# Patient Record
Sex: FEMALE | Race: WHITE | Employment: UNEMPLOYED | URBAN - METROPOLITAN AREA
[De-identification: names, ages, dates, MRNs, and addresses within clinical notes are randomized per-mention and may not be internally consistent; named-entity substitution may affect disease eponyms.]

---

## 2023-06-23 ENCOUNTER — HOSPITAL ENCOUNTER (OUTPATIENT)
Age: 78
Setting detail: SPECIMEN
Discharge: HOME | End: 2023-06-23

## 2023-06-23 LAB
BACTERIA URNS QL MICRO: ABNORMAL
BILIRUB UR QL STRIP: NEGATIVE
CASTS #/AREA URNS LPF: ABNORMAL /LPF (ref 0–8)
CLARITY UR: CLEAR
COLOR UR: YELLOW
EPI CELLS #/AREA URNS HPF: ABNORMAL /HPF (ref 0–5)
GLUCOSE UR STRIP-MCNC: NEGATIVE MG/DL
HGB UR QL STRIP.AUTO: ABNORMAL
KETONES UR STRIP-MCNC: NEGATIVE MG/DL
LEUKOCYTE ESTERASE UR QL STRIP: ABNORMAL
NITRITE UR QL STRIP: NEGATIVE
PH UR STRIP: 6 [PH] (ref 5–8)
PROT UR STRIP-MCNC: ABNORMAL MG/DL
RBC #/AREA URNS HPF: ABNORMAL /HPF (ref 0–4)
SP GR UR STRIP: 1.01 (ref 1–1.03)
UROBILINOGEN UR STRIP-ACNC: NORMAL
WBC #/AREA URNS HPF: ABNORMAL /HPF (ref 0–5)

## 2023-06-25 LAB
MICROORGANISM SPEC CULT: ABNORMAL
SPECIMEN DESCRIPTION: ABNORMAL

## 2023-06-26 LAB
MICROORGANISM SPEC CULT: ABNORMAL
MICROORGANISM SPEC CULT: ABNORMAL
SPECIMEN DESCRIPTION: ABNORMAL

## 2023-09-21 RX ORDER — LISINOPRIL 10 MG/1
10 TABLET ORAL DAILY
Qty: 90 TABLET | Refills: 0 | Status: SHIPPED | OUTPATIENT
Start: 2023-09-21

## 2023-09-21 RX ORDER — LISINOPRIL 10 MG/1
TABLET ORAL
COMMUNITY
Start: 2023-06-23 | End: 2023-09-21 | Stop reason: SDUPTHER

## 2023-09-21 NOTE — TELEPHONE ENCOUNTER
Glenn Hillman is calling to request a refill on the following medication(s):    Medication Request:  Requested Prescriptions     Pending Prescriptions Disp Refills    lisinopril (PRINIVIL;ZESTRIL) 10 MG tablet 90 tablet 0     Sig: Take 1 tablet by mouth daily       Last Visit Date (If Applicable):  Visit date not found    Next Visit Date:    10/18/2023

## 2023-10-06 RX ORDER — MELOXICAM 15 MG/1
15 TABLET ORAL DAILY
Qty: 100 TABLET | Refills: 2 | Status: SHIPPED | OUTPATIENT
Start: 2023-10-06

## 2023-10-06 NOTE — TELEPHONE ENCOUNTER
Maryann Landaverde is calling to request a refill on the following medication(s):    Medication Request:  Requested Prescriptions     Pending Prescriptions Disp Refills    meloxicam (MOBIC) 15 MG tablet [Pharmacy Med Name: Meloxicam 15 MG Oral Tablet] 100 tablet 2     Sig: TAKE 1 TABLET BY MOUTH ONCE  DAILY       Last Visit Date (If Applicable):  Visit date not found    Next Visit Date:    10/18/2023

## 2023-10-16 RX ORDER — FLUTICASONE FUROATE AND VILANTEROL TRIFENATATE 100; 25 UG/1; UG/1
POWDER RESPIRATORY (INHALATION)
COMMUNITY
Start: 2023-09-05

## 2023-10-16 RX ORDER — ALBUTEROL SULFATE 2.5 MG/3ML
2.5 SOLUTION RESPIRATORY (INHALATION) EVERY 6 HOURS PRN
COMMUNITY

## 2023-10-16 RX ORDER — EZETIMIBE 10 MG/1
TABLET ORAL
COMMUNITY
Start: 2023-07-31

## 2023-10-16 RX ORDER — MONTELUKAST SODIUM 10 MG/1
TABLET ORAL
COMMUNITY
Start: 2023-07-31

## 2023-10-16 RX ORDER — ALBUTEROL SULFATE 90 UG/1
2 AEROSOL, METERED RESPIRATORY (INHALATION) EVERY 6 HOURS PRN
COMMUNITY

## 2023-10-16 RX ORDER — AZELASTINE HCL 205.5 UG/1
SPRAY NASAL
COMMUNITY

## 2023-10-16 RX ORDER — ATORVASTATIN CALCIUM 10 MG/1
TABLET, FILM COATED ORAL
COMMUNITY
Start: 2017-07-24

## 2023-10-18 ENCOUNTER — TELEPHONE (OUTPATIENT)
Age: 78
End: 2023-10-18

## 2023-10-18 ENCOUNTER — OFFICE VISIT (OUTPATIENT)
Age: 78
End: 2023-10-18

## 2023-10-18 ENCOUNTER — HOSPITAL ENCOUNTER (OUTPATIENT)
Age: 78
Setting detail: SPECIMEN
Discharge: HOME | End: 2023-10-18

## 2023-10-18 VITALS
HEART RATE: 70 BPM | TEMPERATURE: 97.9 F | BODY MASS INDEX: 29.7 KG/M2 | DIASTOLIC BLOOD PRESSURE: 66 MMHG | SYSTOLIC BLOOD PRESSURE: 138 MMHG | WEIGHT: 161.4 LBS | RESPIRATION RATE: 14 BRPM | HEIGHT: 62 IN

## 2023-10-18 DIAGNOSIS — E78.2 MIXED HYPERLIPIDEMIA: ICD-10-CM

## 2023-10-18 DIAGNOSIS — F32.A FATIGUE DUE TO DEPRESSION: ICD-10-CM

## 2023-10-18 DIAGNOSIS — I10 PRIMARY HYPERTENSION: ICD-10-CM

## 2023-10-18 DIAGNOSIS — F32.9 REACTIVE DEPRESSION: ICD-10-CM

## 2023-10-18 DIAGNOSIS — Z12.31 ENCOUNTER FOR SCREENING MAMMOGRAM FOR MALIGNANT NEOPLASM OF BREAST: Primary | ICD-10-CM

## 2023-10-18 DIAGNOSIS — R53.83 FATIGUE DUE TO DEPRESSION: ICD-10-CM

## 2023-10-18 LAB
BACTERIA URNS QL MICRO: ABNORMAL
BILIRUB UR QL STRIP: NEGATIVE
CASTS #/AREA URNS LPF: ABNORMAL /LPF (ref 0–8)
CLARITY UR: ABNORMAL
COLOR UR: YELLOW
EPI CELLS #/AREA URNS HPF: ABNORMAL /HPF (ref 0–5)
ERYTHROCYTE [DISTWIDTH] IN BLOOD BY AUTOMATED COUNT: 12.5 % (ref 11.8–14.4)
GLUCOSE UR STRIP-MCNC: NEGATIVE MG/DL
HCT VFR BLD AUTO: 44.7 % (ref 36.3–47.1)
HGB BLD-MCNC: 14.5 G/DL (ref 11.9–15.1)
HGB UR QL STRIP.AUTO: ABNORMAL
IRON SERPL-MCNC: 100 UG/DL (ref 37–145)
KETONES UR STRIP-MCNC: ABNORMAL MG/DL
LEUKOCYTE ESTERASE UR QL STRIP: ABNORMAL
MCH RBC QN AUTO: 31 PG (ref 25.2–33.5)
MCHC RBC AUTO-ENTMCNC: 32.4 G/DL (ref 28.4–34.8)
MCV RBC AUTO: 95.5 FL (ref 82.6–102.9)
NITRITE UR QL STRIP: POSITIVE
NRBC BLD-RTO: 0 PER 100 WBC
PH UR STRIP: 5.5 [PH] (ref 5–8)
PLATELET # BLD AUTO: 287 K/UL (ref 138–453)
PMV BLD AUTO: 10.5 FL (ref 8.1–13.5)
PROT UR STRIP-MCNC: ABNORMAL MG/DL
RBC # BLD AUTO: 4.68 M/UL (ref 3.95–5.11)
RBC #/AREA URNS HPF: ABNORMAL /HPF (ref 0–4)
SP GR UR STRIP: 1.02 (ref 1–1.03)
T4 FREE SERPL-MCNC: 1.3 NG/DL (ref 0.9–1.7)
TSH SERPL DL<=0.05 MIU/L-ACNC: 3.45 UIU/ML (ref 0.3–5)
UROBILINOGEN UR STRIP-ACNC: NORMAL EU/DL (ref 0–1)
WBC #/AREA URNS HPF: ABNORMAL /HPF (ref 0–5)
WBC OTHER # BLD: 5.6 K/UL (ref 3.5–11.3)

## 2023-10-18 RX ORDER — VENLAFAXINE HYDROCHLORIDE 75 MG/1
75 CAPSULE, EXTENDED RELEASE ORAL DAILY
Qty: 30 CAPSULE | Refills: 3 | Status: SHIPPED | OUTPATIENT
Start: 2023-10-18

## 2023-10-18 RX ORDER — CEPHALEXIN 500 MG/1
500 CAPSULE ORAL 3 TIMES DAILY
Qty: 30 CAPSULE | Refills: 0 | Status: SHIPPED | OUTPATIENT
Start: 2023-10-18 | End: 2023-10-28

## 2023-10-18 SDOH — ECONOMIC STABILITY: HOUSING INSECURITY
IN THE LAST 12 MONTHS, WAS THERE A TIME WHEN YOU DID NOT HAVE A STEADY PLACE TO SLEEP OR SLEPT IN A SHELTER (INCLUDING NOW)?: NO

## 2023-10-18 SDOH — ECONOMIC STABILITY: INCOME INSECURITY: HOW HARD IS IT FOR YOU TO PAY FOR THE VERY BASICS LIKE FOOD, HOUSING, MEDICAL CARE, AND HEATING?: NOT HARD AT ALL

## 2023-10-18 SDOH — ECONOMIC STABILITY: FOOD INSECURITY: WITHIN THE PAST 12 MONTHS, YOU WORRIED THAT YOUR FOOD WOULD RUN OUT BEFORE YOU GOT MONEY TO BUY MORE.: NEVER TRUE

## 2023-10-18 SDOH — ECONOMIC STABILITY: FOOD INSECURITY: WITHIN THE PAST 12 MONTHS, THE FOOD YOU BOUGHT JUST DIDN'T LAST AND YOU DIDN'T HAVE MONEY TO GET MORE.: NEVER TRUE

## 2023-10-18 ASSESSMENT — PATIENT HEALTH QUESTIONNAIRE - PHQ9
SUM OF ALL RESPONSES TO PHQ QUESTIONS 1-9: 0
SUM OF ALL RESPONSES TO PHQ QUESTIONS 1-9: 0
SUM OF ALL RESPONSES TO PHQ9 QUESTIONS 1 & 2: 0
1. LITTLE INTEREST OR PLEASURE IN DOING THINGS: 0
SUM OF ALL RESPONSES TO PHQ QUESTIONS 1-9: 0
SUM OF ALL RESPONSES TO PHQ QUESTIONS 1-9: 0
2. FEELING DOWN, DEPRESSED OR HOPELESS: 0

## 2023-10-18 NOTE — RESULT ENCOUNTER NOTE
please call patient CBC was normal thyroid was normal iron is not back yet. However her urine shows a lot of microscopic blood and pus cells. I would like her to start an antibiotic until the culture comes back.   Also I would like her to see urology I cannot find who her old urologist was can she give us an name or does she want a new referral

## 2023-10-18 NOTE — PROGRESS NOTES
[facility] Castellano      Date of Visit:  10/18/2023  Patient Name: Mason Jeffries   Patient :  1945     CHIEF COMPLAINT/HPI:     Mason Jeffries is a 70 y.o. female who presents today for an general visit to be evaluated for the following condition(s):  Chief Complaint   Patient presents with    Check-Up     4 months     Patient has had some situational depression of late one of her good friends  recently and then to top it off one of her dogs suddenly  also. So she is having some sleep difficulty and also some psychomotor inanition a. She is trying to get out and do more gardening which seems to help  REVIEW OF SYSTEM      Review of Systems  No fever no sweats no chills no cough no chest pain no shortness of breath no nausea no vomiting no diarrhea.   Physically feels pretty good    REVIEWED INFORMATION      Allergies   Allergen Reactions    Eggs Or Egg-Derived Products     Milk-Related Compounds     Septra [Sulfamethoxazole-Trimethoprim]     Sulfa Antibiotics Headaches     MIGRAINE      Wheat     Adhesive Tape Other (See Comments) and Rash       Current Outpatient Medications   Medication Sig Dispense Refill    venlafaxine (EFFEXOR XR) 75 MG extended release capsule Take 1 capsule by mouth daily 30 capsule 3    atorvastatin (LIPITOR) 10 MG tablet  (Patient not taking: Reported on 10/16/2023)      ezetimibe (ZETIA) 10 MG tablet       BREO ELLIPTA 100-25 MCG/ACT inhaler       montelukast (SINGULAIR) 10 MG tablet       albuterol sulfate HFA (PROVENTIL;VENTOLIN;PROAIR) 108 (90 Base) MCG/ACT inhaler Inhale 2 puffs into the lungs every 6 hours as needed for Wheezing      azelastine HCl 0.15 % SOLN by Nasal route      albuterol (PROVENTIL) (2.5 MG/3ML) 0.083% nebulizer solution Take 3 mLs by nebulization every 6 hours as needed for Wheezing      meloxicam (MOBIC) 15 MG tablet TAKE 1 TABLET BY MOUTH ONCE  DAILY 100 tablet 2    lisinopril (PRINIVIL;ZESTRIL) 10 MG tablet Take 1 tablet by mouth daily 90

## 2023-10-20 LAB
MICROORGANISM SPEC CULT: ABNORMAL
SPECIMEN DESCRIPTION: ABNORMAL

## 2023-11-08 ENCOUNTER — OFFICE VISIT (OUTPATIENT)
Age: 78
End: 2023-11-08

## 2023-11-08 VITALS
TEMPERATURE: 97.3 F | SYSTOLIC BLOOD PRESSURE: 120 MMHG | BODY MASS INDEX: 30.2 KG/M2 | HEART RATE: 76 BPM | DIASTOLIC BLOOD PRESSURE: 78 MMHG | RESPIRATION RATE: 16 BRPM | WEIGHT: 165.13 LBS

## 2023-11-08 DIAGNOSIS — N20.0 KIDNEY STONES: ICD-10-CM

## 2023-11-08 DIAGNOSIS — R30.0 DYSURIA: ICD-10-CM

## 2023-11-08 DIAGNOSIS — E78.2 MIXED HYPERLIPIDEMIA: ICD-10-CM

## 2023-11-08 DIAGNOSIS — Z23 ENCOUNTER FOR IMMUNIZATION: Primary | ICD-10-CM

## 2023-11-08 DIAGNOSIS — I10 PRIMARY HYPERTENSION: ICD-10-CM

## 2023-11-08 DIAGNOSIS — G96.89 SPINAL CORD CYSTS: Primary | ICD-10-CM

## 2023-11-08 PROBLEM — K21.9 GERD (GASTROESOPHAGEAL REFLUX DISEASE): Status: ACTIVE | Noted: 2023-11-08

## 2023-11-08 PROBLEM — M19.90 OSTEOARTHRITIS: Status: ACTIVE | Noted: 2023-11-08

## 2023-11-08 PROBLEM — J45.909 ASTHMA: Status: ACTIVE | Noted: 2023-11-08

## 2023-11-08 PROBLEM — H26.9 CATARACTS, BILATERAL: Status: ACTIVE | Noted: 2023-11-08

## 2023-11-08 PROBLEM — J30.9 ALLERGIC RHINITIS: Status: ACTIVE | Noted: 2023-11-08

## 2023-11-08 NOTE — PROGRESS NOTES
[facility] Bj Schilling      Date of Visit:  2023  Patient Name: Haile Chavez   Patient :  1945     CHIEF COMPLAINT/HPI:     Haile Chavez is a 70 y.o. female who presents today for an general visit to be evaluated for the following condition(s):  Chief Complaint   Patient presents with    Check-Up     3 week check up       patient here for follow-up regarding Effexor she improved mentally and got over the loss of her dog she is now taking this medication and feels back to normal.  Reviewed immunizations she does need pneumococcal 20 and flu vaccine. Recent urine infection resolved with Keflex she will get repeat urine and culture if this is clear she will not need to be referred to urology.   REVIEW OF SYSTEM      Review of Systems    No fever no sweats no chills no chest pain no shortness of breath no nausea no vomiting no diarrhea no urine symptoms now    REVIEWED INFORMATION      Allergies   Allergen Reactions    Eggs Or Egg-Derived Products     Milk-Related Compounds     Septra [Sulfamethoxazole-Trimethoprim]     Sulfa Antibiotics Headaches     MIGRAINE      Wheat     Adhesive Tape Other (See Comments) and Rash       Current Outpatient Medications   Medication Sig Dispense Refill    atorvastatin (LIPITOR) 10 MG tablet  (Patient not taking: Reported on 10/16/2023)      ezetimibe (ZETIA) 10 MG tablet       BREO ELLIPTA 100-25 MCG/ACT inhaler       montelukast (SINGULAIR) 10 MG tablet       albuterol sulfate HFA (PROVENTIL;VENTOLIN;PROAIR) 108 (90 Base) MCG/ACT inhaler Inhale 2 puffs into the lungs every 6 hours as needed for Wheezing      azelastine HCl 0.15 % SOLN by Nasal route      albuterol (PROVENTIL) (2.5 MG/3ML) 0.083% nebulizer solution Take 3 mLs by nebulization every 6 hours as needed for Wheezing      meloxicam (MOBIC) 15 MG tablet TAKE 1 TABLET BY MOUTH ONCE  DAILY 100 tablet 2    lisinopril (PRINIVIL;ZESTRIL) 10 MG tablet Take 1 tablet by mouth daily 90 tablet 0     No current

## 2023-11-08 NOTE — PROGRESS NOTES
After obtaining consent, and per orders of Dr. Felicia Lorenz, injection of Prevnar 20 given in Right deltoid by Elizabeth Gutierrez MA. Patient tolerated the injection well.

## 2023-11-08 NOTE — PROGRESS NOTES
After obtaining consent, and per orders of Dr. Elsa Chamorro, injection of Tresea Spear dose  flu given in Right deltoid by Alyse Osman MA. Patient tolerated the injection well.

## 2023-11-14 ENCOUNTER — HOSPITAL ENCOUNTER (OUTPATIENT)
Age: 78
Setting detail: SPECIMEN
Discharge: HOME | End: 2023-11-14

## 2023-11-14 ENCOUNTER — TELEPHONE (OUTPATIENT)
Age: 78
End: 2023-11-14

## 2023-11-14 DIAGNOSIS — R30.0 DYSURIA: ICD-10-CM

## 2023-11-14 DIAGNOSIS — R30.0 DYSURIA: Primary | ICD-10-CM

## 2023-11-14 LAB
BACTERIA URNS QL MICRO: ABNORMAL
BILIRUB UR QL STRIP: NEGATIVE
CASTS #/AREA URNS LPF: ABNORMAL /LPF (ref 0–8)
CLARITY UR: ABNORMAL
COLOR UR: YELLOW
EPI CELLS #/AREA URNS HPF: ABNORMAL /HPF (ref 0–5)
GLUCOSE UR STRIP-MCNC: NEGATIVE MG/DL
HGB UR QL STRIP.AUTO: ABNORMAL
KETONES UR STRIP-MCNC: NEGATIVE MG/DL
LEUKOCYTE ESTERASE UR QL STRIP: ABNORMAL
NITRITE UR QL STRIP: POSITIVE
PH UR STRIP: 5 [PH] (ref 5–8)
PROT UR STRIP-MCNC: ABNORMAL MG/DL
RBC #/AREA URNS HPF: ABNORMAL /HPF (ref 0–4)
SP GR UR STRIP: 1.02 (ref 1–1.03)
UROBILINOGEN UR STRIP-ACNC: NORMAL EU/DL (ref 0–1)
WBC #/AREA URNS HPF: ABNORMAL /HPF (ref 0–5)

## 2023-11-14 RX ORDER — NITROFURANTOIN 25; 75 MG/1; MG/1
100 CAPSULE ORAL 2 TIMES DAILY
Qty: 20 CAPSULE | Refills: 0 | Status: SHIPPED | OUTPATIENT
Start: 2023-11-14 | End: 2023-11-14

## 2023-11-15 NOTE — TELEPHONE ENCOUNTER
LM with patient that script was sent to the pharmacy and urology referrral is in her chart and to call back if she needs anything else

## 2023-11-16 LAB
MICROORGANISM SPEC CULT: ABNORMAL
SPECIMEN DESCRIPTION: ABNORMAL

## 2023-11-17 ENCOUNTER — TELEPHONE (OUTPATIENT)
Age: 78
End: 2023-11-17

## 2023-11-17 DIAGNOSIS — N39.0 RECURRENT URINARY TRACT INFECTION: ICD-10-CM

## 2023-11-17 DIAGNOSIS — N20.0 KIDNEY STONES: Primary | ICD-10-CM

## 2023-11-22 RX ORDER — LISINOPRIL 10 MG/1
10 TABLET ORAL DAILY
Qty: 90 TABLET | Refills: 3 | Status: SHIPPED | OUTPATIENT
Start: 2023-11-22

## 2023-11-22 NOTE — TELEPHONE ENCOUNTER
Rosemary Salcedo is calling to request a refill on the following medication(s):    Medication Request:  Requested Prescriptions     Pending Prescriptions Disp Refills    lisinopril (PRINIVIL;ZESTRIL) 10 MG tablet [Pharmacy Med Name: Lisinopril 10 MG Oral Tablet] 90 tablet 3     Sig: TAKE 1 TABLET BY MOUTH DAILY       Last Visit Date (If Applicable):  38/0/2203    Next Visit Date:    5/9/2024

## 2023-12-04 ENCOUNTER — HOSPITAL ENCOUNTER (OUTPATIENT)
Dept: MAMMOGRAPHY | Age: 78
Discharge: HOME | End: 2023-12-06
Attending: FAMILY MEDICINE
Payer: MEDICARE

## 2023-12-04 DIAGNOSIS — Z12.31 ENCOUNTER FOR SCREENING MAMMOGRAM FOR MALIGNANT NEOPLASM OF BREAST: ICD-10-CM

## 2023-12-04 PROCEDURE — 77063 BREAST TOMOSYNTHESIS BI: CPT

## 2024-01-04 LAB
CREAT SERPL-MCNC: 0.7 MG/DL
GFR AFRICAN AMERICAN: 98.4 ML/M1.7
GFR NON-AFRICAN AMERICAN: 81.4 ML/M1.7

## 2024-01-24 RX ORDER — SODIUM CHLORIDE, SODIUM LACTATE, POTASSIUM CHLORIDE, CALCIUM CHLORIDE 600; 310; 30; 20 MG/100ML; MG/100ML; MG/100ML; MG/100ML
INJECTION, SOLUTION INTRAVENOUS CONTINUOUS
Status: CANCELLED | OUTPATIENT
Start: 2024-01-24

## 2024-01-24 NOTE — DISCHARGE INSTRUCTIONS
Pre-operative Instructions           NOTHING to eat or drink after midnight the night prior to surgery   (This includes gum, candy, mints, chewing tobacco, etc). Smoking cessation is always advised.     Please arrive at the surgery center (Entrance B) by 9:30 AM on 1/31/2024 (or as directed by your surgeon's office).  See Directons to Surgery Center on next page.     Please take only the following medication(s) the day of surgery with a small sip of water:     If applicable:   -Use/bring daily inhalers with you   -Do not take diabetic medications on the day of surgery.    Please hold Lisinopril morning of surgery, or night prior to surgery if you take it at night.       Please stop any blood thinning medications  AS DIRECTED BY PRESCRIBING PROVIDER! : Meloxicam  Failure to stop these medications as instructed (too soon or too late) may result in injury to you, or your surgery may need to be rescheduled. Below is a list of some examples for your reference.     Antiplatelets : (stop blood cells (called platelets) from sticking together and forming a blood clot):   Aspirin (Bufferin, Ecotrin), Clopidogrel (Plavix), Ticagrelor (Brilinta), Prasugrel (Effient), Dipyridamole/aspirin (Aggrenox), Ticlopidine (Ticlid), Eptifibatide (Integrilin)    Anticoagulants: (slow down your body's process of making clots):   Warfarin (Coumadin), Rivaroxaban (Xarelto), Dabigatran (Pradaxa), Apixaban (Eliquis), Edoxaban (Savaysa), Heparin/Enoxaparin (Lovenox), Fondaparinux (Arixtra)    NSAIDS: Aspirin (Bufferin, Ecotrin), Ibuprofen (Motrin, Nuprin,Advil), Naproxen (Aleve),Meloxicam (Mobic), Celecoxib (Celebrex), Diclofenac (Voltaren), Etodolac (Lodine), Indomethacin, Ketorolac, Nabumetone, Oxaprozin (Daypro), Piroxicam (Feldene), Excedrin (has aspirin in it)    Herbals/supplements: Bromelain, Cinnamon, Cayenne Pepper, Dong quai (female ginseng), Fish oil, Garlic, Kina,Ginkgo biloba, Grape seed extract, Turmeric, Vitamin E,  using the restroom.    No jewelry or piercing's to be worn into surgery because you might be injured because of them.    No contact lenses to be worn.  It is ok to wear your glasses in pre op but they will be removed prior to going into the operating room.    Dentures/partials will likely need to be removed in pre op depending on your type of anesthesia, please do not use adhesives on the day of surgery.    Bathe as instructed with the special soap given to you.  No lotions, powders or creams after bathing.    Wear loose comfortable clothing / shoes that are easy to get on and off over wounds or casts.    If you are going to be admitted after surgery please bring your Cpap or BiPap if you have it at home.    Keep patient belongings to a minimum and leave valuables at home. If you are staying overnight with us, please bring a SMALL bag of personal items.  We cannot accommodate large items, like suitcases.    If you are going home after anesthesia or sedation then you must have a responsible adult with you to take you home and to be with you for the first 24 hours after surgery. If you do not have someone to stay with you your surgery might get cancelled.  Please contact your surgeon's office to see if other arrangements can be made if you can not find someone to stay with you.    If you have any other questions on the day of surgery please contact [phone] or [phone]    If you have any other questions regarding your procedure/surgery please call  your surgeon's office.

## 2024-01-25 ENCOUNTER — HOSPITAL ENCOUNTER (OUTPATIENT)
Dept: PREADMISSION TESTING | Age: 79
Discharge: HOME | End: 2024-01-25
Payer: MEDICARE

## 2024-01-25 VITALS
SYSTOLIC BLOOD PRESSURE: 158 MMHG | DIASTOLIC BLOOD PRESSURE: 83 MMHG | WEIGHT: 165 LBS | TEMPERATURE: 98.6 F | BODY MASS INDEX: 30.36 KG/M2 | HEIGHT: 62 IN | HEART RATE: 66 BPM | OXYGEN SATURATION: 96 % | RESPIRATION RATE: 14 BRPM

## 2024-01-25 LAB
ANION GAP SERPL CALCULATED.3IONS-SCNC: 8 MMOL/L (ref 9–17)
BUN SERPL-MCNC: 15 MG/DL (ref 8–23)
CHLORIDE SERPL-SCNC: 104 MMOL/L (ref 98–107)
CO2 SERPL-SCNC: 31 MMOL/L (ref 20–31)
CREAT SERPL-MCNC: 0.7 MG/DL (ref 0.5–0.9)
EKG ATRIAL RATE: 64 BPM
EKG P AXIS: 58 DEGREES
EKG P-R INTERVAL: 120 MS
EKG Q-T INTERVAL: 400 MS
EKG QRS DURATION: 86 MS
EKG QTC CALCULATION (BAZETT): 412 MS
EKG R AXIS: 93 DEGREES
EKG T AXIS: 33 DEGREES
EKG VENTRICULAR RATE: 64 BPM
ERYTHROCYTE [DISTWIDTH] IN BLOOD BY AUTOMATED COUNT: 12.3 % (ref 11.8–14.4)
GFR SERPL CREATININE-BSD FRML MDRD: >60 ML/MIN/1.73M2
GLUCOSE SERPL-MCNC: 103 MG/DL (ref 70–99)
HCT VFR BLD AUTO: 43.7 % (ref 36.3–47.1)
HGB BLD-MCNC: 14.3 G/DL (ref 11.9–15.1)
INR PPP: 1
MCH RBC QN AUTO: 30.6 PG (ref 25.2–33.5)
MCHC RBC AUTO-ENTMCNC: 32.7 G/DL (ref 28.4–34.8)
MCV RBC AUTO: 93.4 FL (ref 82.6–102.9)
NRBC BLD-RTO: 0 PER 100 WBC
PARTIAL THROMBOPLASTIN TIME: 28.2 SEC (ref 23–36.5)
PLATELET # BLD AUTO: 259 K/UL (ref 138–453)
PMV BLD AUTO: 10 FL (ref 8.1–13.5)
POTASSIUM SERPL-SCNC: 4.2 MMOL/L (ref 3.7–5.3)
PROTHROMBIN TIME: 12.4 SEC (ref 11.7–14.9)
RBC # BLD AUTO: 4.68 M/UL (ref 3.95–5.11)
SODIUM SERPL-SCNC: 143 MMOL/L (ref 135–144)
WBC OTHER # BLD: 5.3 K/UL (ref 3.5–11.3)

## 2024-01-25 PROCEDURE — 82947 ASSAY GLUCOSE BLOOD QUANT: CPT

## 2024-01-25 PROCEDURE — 85610 PROTHROMBIN TIME: CPT

## 2024-01-25 PROCEDURE — 87186 SC STD MICRODIL/AGAR DIL: CPT

## 2024-01-25 PROCEDURE — 80051 ELECTROLYTE PANEL: CPT

## 2024-01-25 PROCEDURE — 84520 ASSAY OF UREA NITROGEN: CPT

## 2024-01-25 PROCEDURE — 85027 COMPLETE CBC AUTOMATED: CPT

## 2024-01-25 PROCEDURE — 87077 CULTURE AEROBIC IDENTIFY: CPT

## 2024-01-25 PROCEDURE — 87086 URINE CULTURE/COLONY COUNT: CPT

## 2024-01-25 PROCEDURE — 82565 ASSAY OF CREATININE: CPT

## 2024-01-25 PROCEDURE — 93005 ELECTROCARDIOGRAM TRACING: CPT

## 2024-01-25 PROCEDURE — 85730 THROMBOPLASTIN TIME PARTIAL: CPT

## 2024-01-25 RX ORDER — ACETAMINOPHEN/DIPHENHYDRAMINE 500MG-25MG
TABLET ORAL
COMMUNITY
Start: 2014-11-03

## 2024-01-25 RX ORDER — CIPROFLOXACIN 2 MG/ML
400 INJECTION, SOLUTION INTRAVENOUS ONCE
Status: CANCELLED | OUTPATIENT
Start: 2024-01-25 | End: 2024-01-25

## 2024-01-25 NOTE — PROGRESS NOTES
Anesthesia Focused Assessment  Upcoming surgery:   1/31/2024 CYSTOSCOPY INSERTION OCCLUSIVE BALLOON  (PT. GOING TO INTERVENTIONAL RAD) - Right Rian Ludwig MD   1/31/2024 HOLMIUM, PERCUTANEOUS NEPHROLITHOTOMY, LITHOCLAST, C-ARM  (PT. COMING FROM INTERVENTIONAL RAD) - Right Rian Ludwig MD     Hx of anesthesia complications:  PONV states severe. She also reports prior c-spine surgery with very limited extension and rotation of neck  Family hx of anesthesia complications:  no    METS functional capacity:   -Moderate, relates to asthma, but stays active with gardening and states asthma well controlled with current inhaler regimen    + Covid-19 test in last 8 weeks? no    STOP-BANG Sleep Apnea Questionnaire  SNORE loudly (heard through closed doors)?   No  TIRED, fatigued, sleepy during daytime?    Yes  OBSERVED stopping breathing during sleep?   No  High blood PRESSURE or being treated?    Yes    BMI over 35?        No  AGE over 50?        Yes  NECK circumference over 16\"?     No  GENDER (male)?       No             Total 3  High risk 5-8  Intermediate risk 3-4  Low risk 0-2    ----------------------------------------------------------------------------------------------------------------------  JOSHUA:                                             no  If yes, machine?:                              Type 1 DM:                                   no  T2DM:                                           no    Coronary Artery Disease:             no  Hypertension:                               yes  Defib / AICD / Pacemaker:           no    Renal Failure:                               no  If yes, on dialysis?:                           Active smoker:                              no  Drinks Alcohol:                              no  Illicit drugs:                                    no    Dentition:                                     permanent  bridge bottom    Past Medical History:   Diagnosis Date    Allergic rhinitis

## 2024-01-25 NOTE — H&P (VIEW-ONLY)
her father and mother.    Review of Systems:  CONSTITUTIONAL:   negative for fevers, chills, fatigue and malaise    EYES:   negative for double vision, blurred vision and photophobia    HEENT:   negative for tinnitus, epistaxis and sore throat     RESPIRATORY:   negative for cough, shortness of breath, wheezing     CARDIOVASCULAR:   negative for chest pain, palpitations, syncope, edema     GASTROINTESTINAL:   negative for nausea, vomiting     GENITOURINARY:   negative for incontinence  +per HPI   MUSCULOSKELETAL:   negative for neck or back pain     NEUROLOGICAL:   Negative for weakness and tingling  negative for headaches and dizziness     PSYCHIATRIC:   negative for anxiety       OBJECTIVE:   VITALS:  height is 1.575 m (5' 2\") and weight is 74.8 kg (165 lb). Her temporal temperature is 98.6 °F (37 °C). Her blood pressure is 158/83 (abnormal) and her pulse is 66. Her respiration is 14 and oxygen saturation is 96%.   CONSTITUTIONAL:alert & oriented x 3, no acute distress. Friendly. Very pleasant and talkative.   SKIN:  Warm and dry, no rashes on exposed areas of skin   HEAD:  Normocephalic, atraumatic   EYES: EOMs intact. PERRL.   EARS:  Equal bilaterally, no edema or thickening, skin is intact without lumps or lesions. No discharge. Hearing grossly WNL.    NOSE:  Nares patent.  No rhinorrhea   MOUTH/THROAT:  Mucous membranes moist, tongue is pink, uvula midline, teeth appear to be intact, reports having permanent bridge  NECK: limited ROM with extension and rotation  LUNGS: Respirations even and non-labored. Clear to auscultation bilaterally, no wheezes, rales, or rhonchi.    CARDIOVASCULAR: Regular rate and rhythm, no murmurs/rubs/gallops   ABDOMEN: soft, non-tender, non-distended, bowel sounds active x 4   EXTREMITIES: No edema bilateral lower extremities.  No varicosities bilateral lower extremities. Right lateral hand atrophy.   NEUROLOGIC: CN II-XII are grossly intact.  Gait not assessed.   Testing:   EKG:  1/25/24  Labs pending: drawn 1/25/2024   IMPRESSIONS:   Kidney stone    PLANS:   Cysto, PCNL- Right     EMILIA OCHOA - CNP  Electronically signed 1/25/2024 at 11:11 AM

## 2024-01-26 LAB
MICROORGANISM SPEC CULT: ABNORMAL
SPECIMEN DESCRIPTION: ABNORMAL

## 2024-01-29 LAB
EKG ATRIAL RATE: 64 BPM
EKG P AXIS: 58 DEGREES
EKG P-R INTERVAL: 120 MS
EKG Q-T INTERVAL: 400 MS
EKG QRS DURATION: 86 MS
EKG QTC CALCULATION (BAZETT): 412 MS
EKG R AXIS: 93 DEGREES
EKG T AXIS: 33 DEGREES
EKG VENTRICULAR RATE: 64 BPM

## 2024-01-31 ENCOUNTER — ANESTHESIA (OUTPATIENT)
Dept: INTERVENTIONAL RADIOLOGY/VASCULAR | Age: 79
End: 2024-01-31
Payer: MEDICARE

## 2024-01-31 ENCOUNTER — ANESTHESIA (OUTPATIENT)
Dept: OPERATING ROOM | Age: 79
End: 2024-01-31
Payer: MEDICARE

## 2024-01-31 ENCOUNTER — APPOINTMENT (OUTPATIENT)
Dept: GENERAL RADIOLOGY | Age: 79
End: 2024-01-31
Attending: UROLOGY
Payer: MEDICARE

## 2024-01-31 ENCOUNTER — ANESTHESIA EVENT (OUTPATIENT)
Dept: INTERVENTIONAL RADIOLOGY/VASCULAR | Age: 79
End: 2024-01-31
Payer: MEDICARE

## 2024-01-31 ENCOUNTER — HOSPITAL ENCOUNTER (OUTPATIENT)
Age: 79
Setting detail: OBSERVATION
Discharge: HOME | End: 2024-02-01
Attending: UROLOGY | Admitting: UROLOGY
Payer: MEDICARE

## 2024-01-31 ENCOUNTER — HOSPITAL ENCOUNTER (OUTPATIENT)
Dept: INTERVENTIONAL RADIOLOGY/VASCULAR | Age: 79
Setting detail: OUTPATIENT SURGERY
Discharge: HOME | End: 2024-02-02
Attending: UROLOGY
Payer: MEDICARE

## 2024-01-31 ENCOUNTER — ANESTHESIA EVENT (OUTPATIENT)
Dept: OPERATING ROOM | Age: 79
End: 2024-01-31
Payer: MEDICARE

## 2024-01-31 DIAGNOSIS — N20.0 RIGHT KIDNEY STONE: ICD-10-CM

## 2024-01-31 DIAGNOSIS — G89.18 ACUTE POST-OPERATIVE PAIN: Primary | ICD-10-CM

## 2024-01-31 LAB
ANION GAP SERPL CALCULATED.3IONS-SCNC: 11 MMOL/L (ref 9–17)
BUN SERPL-MCNC: 15 MG/DL (ref 8–23)
CALCIUM SERPL-MCNC: 9.3 MG/DL (ref 8.6–10.4)
CHLORIDE SERPL-SCNC: 106 MMOL/L (ref 98–107)
CO2 SERPL-SCNC: 25 MMOL/L (ref 20–31)
CREAT SERPL-MCNC: 0.7 MG/DL (ref 0.5–0.9)
ERYTHROCYTE [DISTWIDTH] IN BLOOD BY AUTOMATED COUNT: 12.3 % (ref 11.8–14.4)
GFR SERPL CREATININE-BSD FRML MDRD: >60 ML/MIN/1.73M2
GLUCOSE SERPL-MCNC: 159 MG/DL (ref 70–99)
HCT VFR BLD AUTO: 42.3 % (ref 36.3–47.1)
HGB BLD-MCNC: 13.6 G/DL (ref 11.9–15.1)
MAGNESIUM SERPL-MCNC: 2.3 MG/DL (ref 1.6–2.6)
MCH RBC QN AUTO: 30.4 PG (ref 25.2–33.5)
MCHC RBC AUTO-ENTMCNC: 32.2 G/DL (ref 28.4–34.8)
MCV RBC AUTO: 94.4 FL (ref 82.6–102.9)
NRBC BLD-RTO: 0 PER 100 WBC
PLATELET # BLD AUTO: 194 K/UL (ref 138–453)
PMV BLD AUTO: 10.5 FL (ref 8.1–13.5)
POTASSIUM SERPL-SCNC: 4.2 MMOL/L (ref 3.7–5.3)
RBC # BLD AUTO: 4.48 M/UL (ref 3.95–5.11)
SODIUM SERPL-SCNC: 142 MMOL/L (ref 135–144)
WBC OTHER # BLD: 9 K/UL (ref 3.5–11.3)

## 2024-01-31 PROCEDURE — 7100000001 HC PACU RECOVERY - ADDTL 15 MIN: Performed by: UROLOGY

## 2024-01-31 PROCEDURE — 6360000002 HC RX W HCPCS

## 2024-01-31 PROCEDURE — 2500000003 HC RX 250 WO HCPCS

## 2024-01-31 PROCEDURE — 2580000003 HC RX 258

## 2024-01-31 PROCEDURE — 85027 COMPLETE CBC AUTOMATED: CPT

## 2024-01-31 PROCEDURE — 94002 VENT MGMT INPAT INIT DAY: CPT

## 2024-01-31 PROCEDURE — 2700000000 HC OXYGEN THERAPY PER DAY

## 2024-01-31 PROCEDURE — 2580000003 HC RX 258: Performed by: UROLOGY

## 2024-01-31 PROCEDURE — 3600000013 HC SURGERY LEVEL 3 ADDTL 15MIN: Performed by: UROLOGY

## 2024-01-31 PROCEDURE — 50433 PLMT NEPHROURETERAL CATHETER: CPT

## 2024-01-31 PROCEDURE — 2709999900 HC NON-CHARGEABLE SUPPLY: Performed by: UROLOGY

## 2024-01-31 PROCEDURE — 3600000004 HC SURGERY LEVEL 4 BASE: Performed by: UROLOGY

## 2024-01-31 PROCEDURE — A4216 STERILE WATER/SALINE, 10 ML: HCPCS | Performed by: UROLOGY

## 2024-01-31 PROCEDURE — C2628 CATHETER, OCCLUSION: HCPCS | Performed by: UROLOGY

## 2024-01-31 PROCEDURE — G0378 HOSPITAL OBSERVATION PER HR: HCPCS

## 2024-01-31 PROCEDURE — 2580000003 HC RX 258: Performed by: ANESTHESIOLOGY

## 2024-01-31 PROCEDURE — 6360000002 HC RX W HCPCS: Performed by: ANESTHESIOLOGY

## 2024-01-31 PROCEDURE — C1769 GUIDE WIRE: HCPCS | Performed by: UROLOGY

## 2024-01-31 PROCEDURE — C1726 CATH, BAL DIL, NON-VASCULAR: HCPCS | Performed by: UROLOGY

## 2024-01-31 PROCEDURE — C1769 GUIDE WIRE: HCPCS

## 2024-01-31 PROCEDURE — 3600000014 HC SURGERY LEVEL 4 ADDTL 15MIN: Performed by: UROLOGY

## 2024-01-31 PROCEDURE — 82365 CALCULUS SPECTROSCOPY: CPT

## 2024-01-31 PROCEDURE — 6370000000 HC RX 637 (ALT 250 FOR IP): Performed by: ANESTHESIOLOGY

## 2024-01-31 PROCEDURE — C1894 INTRO/SHEATH, NON-LASER: HCPCS | Performed by: UROLOGY

## 2024-01-31 PROCEDURE — 3600000003 HC SURGERY LEVEL 3 BASE: Performed by: UROLOGY

## 2024-01-31 PROCEDURE — 80048 BASIC METABOLIC PNL TOTAL CA: CPT

## 2024-01-31 PROCEDURE — 94761 N-INVAS EAR/PLS OXIMETRY MLT: CPT

## 2024-01-31 PROCEDURE — 2720000010 HC SURG SUPPLY STERILE: Performed by: UROLOGY

## 2024-01-31 PROCEDURE — 3700000000 HC ANESTHESIA ATTENDED CARE: Performed by: UROLOGY

## 2024-01-31 PROCEDURE — 6360000002 HC RX W HCPCS: Performed by: STUDENT IN AN ORGANIZED HEALTH CARE EDUCATION/TRAINING PROGRAM

## 2024-01-31 PROCEDURE — 2580000003 HC RX 258: Performed by: STUDENT IN AN ORGANIZED HEALTH CARE EDUCATION/TRAINING PROGRAM

## 2024-01-31 PROCEDURE — 3700000001 HC ADD 15 MINUTES (ANESTHESIA): Performed by: UROLOGY

## 2024-01-31 PROCEDURE — 83735 ASSAY OF MAGNESIUM: CPT

## 2024-01-31 PROCEDURE — 6360000004 HC RX CONTRAST MEDICATION: Performed by: UROLOGY

## 2024-01-31 PROCEDURE — 7100000000 HC PACU RECOVERY - FIRST 15 MIN: Performed by: UROLOGY

## 2024-01-31 PROCEDURE — 94003 VENT MGMT INPAT SUBQ DAY: CPT

## 2024-01-31 PROCEDURE — 6360000004 HC RX CONTRAST MEDICATION: Performed by: RADIOLOGY

## 2024-01-31 RX ORDER — PROPOFOL 10 MG/ML
INJECTION, EMULSION INTRAVENOUS PRN
Status: DISCONTINUED | OUTPATIENT
Start: 2024-01-31 | End: 2024-01-31 | Stop reason: SDUPTHER

## 2024-01-31 RX ORDER — CIPROFLOXACIN 2 MG/ML
INJECTION, SOLUTION INTRAVENOUS PRN
Status: DISCONTINUED | OUTPATIENT
Start: 2024-01-31 | End: 2024-01-31 | Stop reason: SDUPTHER

## 2024-01-31 RX ORDER — OXYCODONE HYDROCHLORIDE 5 MG/1
5 TABLET ORAL PRN
Status: DISCONTINUED | OUTPATIENT
Start: 2024-01-31 | End: 2024-01-31

## 2024-01-31 RX ORDER — ONDANSETRON 2 MG/ML
INJECTION INTRAMUSCULAR; INTRAVENOUS PRN
Status: DISCONTINUED | OUTPATIENT
Start: 2024-01-31 | End: 2024-01-31 | Stop reason: SDUPTHER

## 2024-01-31 RX ORDER — DROPERIDOL 2.5 MG/ML
0.62 INJECTION, SOLUTION INTRAMUSCULAR; INTRAVENOUS ONCE
Status: COMPLETED | OUTPATIENT
Start: 2024-01-31 | End: 2024-01-31

## 2024-01-31 RX ORDER — HYDRALAZINE HYDROCHLORIDE 20 MG/ML
10 INJECTION INTRAMUSCULAR; INTRAVENOUS
Status: DISCONTINUED | OUTPATIENT
Start: 2024-01-31 | End: 2024-01-31 | Stop reason: SDUPTHER

## 2024-01-31 RX ORDER — OXYCODONE HYDROCHLORIDE 5 MG/1
5 TABLET ORAL EVERY 4 HOURS PRN
Status: DISCONTINUED | OUTPATIENT
Start: 2024-01-31 | End: 2024-02-01 | Stop reason: HOSPADM

## 2024-01-31 RX ORDER — MONTELUKAST SODIUM 10 MG/1
10 TABLET ORAL
Status: DISCONTINUED | OUTPATIENT
Start: 2024-01-31 | End: 2024-02-01 | Stop reason: HOSPADM

## 2024-01-31 RX ORDER — PHENYLEPHRINE HCL IN 0.9% NACL 1 MG/10 ML
SYRINGE (ML) INTRAVENOUS PRN
Status: DISCONTINUED | OUTPATIENT
Start: 2024-01-31 | End: 2024-01-31 | Stop reason: SDUPTHER

## 2024-01-31 RX ORDER — SODIUM CHLORIDE 0.9 % (FLUSH) 0.9 %
5-40 SYRINGE (ML) INJECTION EVERY 12 HOURS SCHEDULED
Status: DISCONTINUED | OUTPATIENT
Start: 2024-01-31 | End: 2024-02-01 | Stop reason: HOSPADM

## 2024-01-31 RX ORDER — TROSPIUM CHLORIDE 20 MG/1
20 TABLET, FILM COATED ORAL 2 TIMES DAILY PRN
Status: DISCONTINUED | OUTPATIENT
Start: 2024-01-31 | End: 2024-02-01 | Stop reason: HOSPADM

## 2024-01-31 RX ORDER — MAGNESIUM HYDROXIDE 1200 MG/15ML
LIQUID ORAL PRN
Status: DISCONTINUED | OUTPATIENT
Start: 2024-01-31 | End: 2024-01-31 | Stop reason: HOSPADM

## 2024-01-31 RX ORDER — ONDANSETRON 2 MG/ML
4 INJECTION INTRAMUSCULAR; INTRAVENOUS
Status: COMPLETED | OUTPATIENT
Start: 2024-01-31 | End: 2024-01-31

## 2024-01-31 RX ORDER — POLYETHYLENE GLYCOL 3350 17 G/17G
17 POWDER, FOR SOLUTION ORAL DAILY PRN
Status: DISCONTINUED | OUTPATIENT
Start: 2024-01-31 | End: 2024-02-01 | Stop reason: HOSPADM

## 2024-01-31 RX ORDER — SCOLOPAMINE TRANSDERMAL SYSTEM 1 MG/1
1 PATCH, EXTENDED RELEASE TRANSDERMAL
Status: DISCONTINUED | OUTPATIENT
Start: 2024-01-31 | End: 2024-02-01 | Stop reason: HOSPADM

## 2024-01-31 RX ORDER — SODIUM CHLORIDE 9 MG/ML
INJECTION, SOLUTION INTRAVENOUS PRN
Status: DISCONTINUED | OUTPATIENT
Start: 2024-01-31 | End: 2024-02-01 | Stop reason: HOSPADM

## 2024-01-31 RX ORDER — FENTANYL CITRATE 50 UG/ML
INJECTION, SOLUTION INTRAMUSCULAR; INTRAVENOUS PRN
Status: DISCONTINUED | OUTPATIENT
Start: 2024-01-31 | End: 2024-01-31 | Stop reason: SDUPTHER

## 2024-01-31 RX ORDER — DEXAMETHASONE SODIUM PHOSPHATE 10 MG/ML
INJECTION INTRAMUSCULAR; INTRAVENOUS PRN
Status: DISCONTINUED | OUTPATIENT
Start: 2024-01-31 | End: 2024-01-31 | Stop reason: SDUPTHER

## 2024-01-31 RX ORDER — LABETALOL HYDROCHLORIDE 5 MG/ML
10 INJECTION, SOLUTION INTRAVENOUS
Status: DISCONTINUED | OUTPATIENT
Start: 2024-01-31 | End: 2024-01-31 | Stop reason: HOSPADM

## 2024-01-31 RX ORDER — ROCURONIUM BROMIDE 10 MG/ML
INJECTION, SOLUTION INTRAVENOUS PRN
Status: DISCONTINUED | OUTPATIENT
Start: 2024-01-31 | End: 2024-01-31 | Stop reason: SDUPTHER

## 2024-01-31 RX ORDER — MINERAL OIL AND WHITE PETROLATUM 150; 830 MG/G; MG/G
OINTMENT OPHTHALMIC PRN
Status: DISCONTINUED | OUTPATIENT
Start: 2024-01-31 | End: 2024-02-01 | Stop reason: HOSPADM

## 2024-01-31 RX ORDER — LABETALOL HYDROCHLORIDE 5 MG/ML
10 INJECTION, SOLUTION INTRAVENOUS
Status: DISCONTINUED | OUTPATIENT
Start: 2024-01-31 | End: 2024-01-31 | Stop reason: SDUPTHER

## 2024-01-31 RX ORDER — CIPROFLOXACIN 2 MG/ML
400 INJECTION, SOLUTION INTRAVENOUS ONCE
Status: DISCONTINUED | OUTPATIENT
Start: 2024-01-31 | End: 2024-01-31 | Stop reason: HOSPADM

## 2024-01-31 RX ORDER — SODIUM CHLORIDE, SODIUM LACTATE, POTASSIUM CHLORIDE, CALCIUM CHLORIDE 600; 310; 30; 20 MG/100ML; MG/100ML; MG/100ML; MG/100ML
INJECTION, SOLUTION INTRAVENOUS CONTINUOUS PRN
Status: DISCONTINUED | OUTPATIENT
Start: 2024-01-31 | End: 2024-01-31 | Stop reason: SDUPTHER

## 2024-01-31 RX ORDER — OXYCODONE HYDROCHLORIDE 5 MG/1
10 TABLET ORAL EVERY 4 HOURS PRN
Status: DISCONTINUED | OUTPATIENT
Start: 2024-01-31 | End: 2024-02-01 | Stop reason: HOSPADM

## 2024-01-31 RX ORDER — SODIUM CHLORIDE 0.9 % (FLUSH) 0.9 %
5-40 SYRINGE (ML) INJECTION PRN
Status: DISCONTINUED | OUTPATIENT
Start: 2024-01-31 | End: 2024-01-31 | Stop reason: HOSPADM

## 2024-01-31 RX ORDER — SODIUM CHLORIDE 0.9 % (FLUSH) 0.9 %
5-40 SYRINGE (ML) INJECTION PRN
Status: DISCONTINUED | OUTPATIENT
Start: 2024-01-31 | End: 2024-02-01 | Stop reason: HOSPADM

## 2024-01-31 RX ORDER — BUDESONIDE AND FORMOTEROL FUMARATE DIHYDRATE 80; 4.5 UG/1; UG/1
2 AEROSOL RESPIRATORY (INHALATION)
Status: DISCONTINUED | OUTPATIENT
Start: 2024-01-31 | End: 2024-02-01 | Stop reason: HOSPADM

## 2024-01-31 RX ORDER — PROPOFOL 10 MG/ML
INJECTION, EMULSION INTRAVENOUS CONTINUOUS PRN
Status: DISCONTINUED | OUTPATIENT
Start: 2024-01-31 | End: 2024-01-31 | Stop reason: SDUPTHER

## 2024-01-31 RX ORDER — SODIUM CHLORIDE, SODIUM LACTATE, POTASSIUM CHLORIDE, CALCIUM CHLORIDE 600; 310; 30; 20 MG/100ML; MG/100ML; MG/100ML; MG/100ML
INJECTION, SOLUTION INTRAVENOUS CONTINUOUS
Status: DISCONTINUED | OUTPATIENT
Start: 2024-01-31 | End: 2024-01-31 | Stop reason: HOSPADM

## 2024-01-31 RX ORDER — SODIUM CHLORIDE 9 MG/ML
INJECTION, SOLUTION INTRAVENOUS PRN
Status: DISCONTINUED | OUTPATIENT
Start: 2024-01-31 | End: 2024-01-31 | Stop reason: HOSPADM

## 2024-01-31 RX ORDER — OXYCODONE HYDROCHLORIDE 5 MG/1
10 TABLET ORAL PRN
Status: DISCONTINUED | OUTPATIENT
Start: 2024-01-31 | End: 2024-01-31

## 2024-01-31 RX ORDER — ACETAMINOPHEN 325 MG/1
650 TABLET ORAL EVERY 6 HOURS PRN
Status: DISCONTINUED | OUTPATIENT
Start: 2024-01-31 | End: 2024-01-31

## 2024-01-31 RX ORDER — MAGNESIUM HYDROXIDE 1200 MG/15ML
LIQUID ORAL CONTINUOUS PRN
Status: DISCONTINUED | OUTPATIENT
Start: 2024-01-31 | End: 2024-01-31 | Stop reason: HOSPADM

## 2024-01-31 RX ORDER — METHOCARBAMOL 500 MG/1
500 TABLET, FILM COATED ORAL 4 TIMES DAILY
Status: DISCONTINUED | OUTPATIENT
Start: 2024-01-31 | End: 2024-02-01 | Stop reason: HOSPADM

## 2024-01-31 RX ORDER — ONDANSETRON 2 MG/ML
4 INJECTION INTRAMUSCULAR; INTRAVENOUS
Status: DISCONTINUED | OUTPATIENT
Start: 2024-01-31 | End: 2024-01-31 | Stop reason: SDUPTHER

## 2024-01-31 RX ORDER — HYDRALAZINE HYDROCHLORIDE 20 MG/ML
10 INJECTION INTRAMUSCULAR; INTRAVENOUS
Status: DISCONTINUED | OUTPATIENT
Start: 2024-01-31 | End: 2024-01-31 | Stop reason: HOSPADM

## 2024-01-31 RX ORDER — ACETAMINOPHEN 325 MG/1
650 TABLET ORAL EVERY 6 HOURS SCHEDULED
Status: DISCONTINUED | OUTPATIENT
Start: 2024-02-01 | End: 2024-02-01

## 2024-01-31 RX ORDER — ONDANSETRON 2 MG/ML
4 INJECTION INTRAMUSCULAR; INTRAVENOUS EVERY 6 HOURS PRN
Status: DISCONTINUED | OUTPATIENT
Start: 2024-01-31 | End: 2024-02-01 | Stop reason: HOSPADM

## 2024-01-31 RX ORDER — CHOLECALCIFEROL (VITAMIN D3) 125 MCG
5 CAPSULE ORAL NIGHTLY PRN
Status: DISCONTINUED | OUTPATIENT
Start: 2024-01-31 | End: 2024-02-01 | Stop reason: HOSPADM

## 2024-01-31 RX ORDER — ONDANSETRON 4 MG/1
4 TABLET, ORALLY DISINTEGRATING ORAL EVERY 8 HOURS PRN
Status: DISCONTINUED | OUTPATIENT
Start: 2024-01-31 | End: 2024-02-01 | Stop reason: HOSPADM

## 2024-01-31 RX ORDER — SODIUM CHLORIDE 9 MG/ML
INJECTION INTRAVENOUS PRN
Status: DISCONTINUED | OUTPATIENT
Start: 2024-01-31 | End: 2024-01-31 | Stop reason: HOSPADM

## 2024-01-31 RX ORDER — SODIUM CHLORIDE 0.9 % (FLUSH) 0.9 %
5-40 SYRINGE (ML) INJECTION EVERY 12 HOURS SCHEDULED
Status: DISCONTINUED | OUTPATIENT
Start: 2024-01-31 | End: 2024-01-31 | Stop reason: HOSPADM

## 2024-01-31 RX ORDER — CEFAZOLIN SODIUM 1 G/3ML
INJECTION, POWDER, FOR SOLUTION INTRAMUSCULAR; INTRAVENOUS PRN
Status: DISCONTINUED | OUTPATIENT
Start: 2024-01-31 | End: 2024-01-31 | Stop reason: SDUPTHER

## 2024-01-31 RX ORDER — SODIUM CHLORIDE 9 MG/ML
INJECTION, SOLUTION INTRAVENOUS CONTINUOUS
Status: DISCONTINUED | OUTPATIENT
Start: 2024-01-31 | End: 2024-02-01 | Stop reason: HOSPADM

## 2024-01-31 RX ORDER — EZETIMIBE 10 MG/1
10 TABLET ORAL
Status: DISCONTINUED | OUTPATIENT
Start: 2024-01-31 | End: 2024-02-01 | Stop reason: HOSPADM

## 2024-01-31 RX ORDER — MORPHINE SULFATE 2 MG/ML
2 INJECTION, SOLUTION INTRAMUSCULAR; INTRAVENOUS EVERY 4 HOURS PRN
Status: DISCONTINUED | OUTPATIENT
Start: 2024-01-31 | End: 2024-01-31

## 2024-01-31 RX ORDER — LIDOCAINE HYDROCHLORIDE 10 MG/ML
INJECTION, SOLUTION EPIDURAL; INFILTRATION; INTRACAUDAL; PERINEURAL PRN
Status: DISCONTINUED | OUTPATIENT
Start: 2024-01-31 | End: 2024-01-31 | Stop reason: SDUPTHER

## 2024-01-31 RX ADMIN — Medication 50 MCG: at 12:57

## 2024-01-31 RX ADMIN — ROCURONIUM BROMIDE 20 MG: 10 INJECTION, SOLUTION INTRAVENOUS at 13:56

## 2024-01-31 RX ADMIN — SODIUM CHLORIDE, POTASSIUM CHLORIDE, SODIUM LACTATE AND CALCIUM CHLORIDE: 600; 310; 30; 20 INJECTION, SOLUTION INTRAVENOUS at 13:44

## 2024-01-31 RX ADMIN — SODIUM CHLORIDE: 9 INJECTION, SOLUTION INTRAVENOUS at 18:23

## 2024-01-31 RX ADMIN — DEXAMETHASONE SODIUM PHOSPHATE 10 MG: 10 INJECTION INTRAMUSCULAR; INTRAVENOUS at 11:29

## 2024-01-31 RX ADMIN — IOPAMIDOL 17 ML: 755 INJECTION, SOLUTION INTRAVENOUS at 13:25

## 2024-01-31 RX ADMIN — FENTANYL CITRATE 50 MCG: 50 INJECTION, SOLUTION INTRAMUSCULAR; INTRAVENOUS at 13:16

## 2024-01-31 RX ADMIN — PROPOFOL 70 MCG/KG/MIN: 10 INJECTION, EMULSION INTRAVENOUS at 12:05

## 2024-01-31 RX ADMIN — SODIUM CHLORIDE, POTASSIUM CHLORIDE, SODIUM LACTATE AND CALCIUM CHLORIDE: 600; 310; 30; 20 INJECTION, SOLUTION INTRAVENOUS at 10:22

## 2024-01-31 RX ADMIN — Medication 2000 MG: at 21:30

## 2024-01-31 RX ADMIN — ONDANSETRON 4 MG: 2 INJECTION INTRAMUSCULAR; INTRAVENOUS at 15:36

## 2024-01-31 RX ADMIN — ROCURONIUM BROMIDE 50 MG: 10 INJECTION, SOLUTION INTRAVENOUS at 11:22

## 2024-01-31 RX ADMIN — PROPOFOL 150 MG: 10 INJECTION, EMULSION INTRAVENOUS at 11:22

## 2024-01-31 RX ADMIN — FENTANYL CITRATE 50 MCG: 50 INJECTION, SOLUTION INTRAMUSCULAR; INTRAVENOUS at 11:22

## 2024-01-31 RX ADMIN — ROCURONIUM BROMIDE 50 MG: 10 INJECTION INTRAVENOUS at 12:32

## 2024-01-31 RX ADMIN — SODIUM CHLORIDE, POTASSIUM CHLORIDE, SODIUM LACTATE AND CALCIUM CHLORIDE: 600; 310; 30; 20 INJECTION, SOLUTION INTRAVENOUS at 11:20

## 2024-01-31 RX ADMIN — LIDOCAINE HYDROCHLORIDE 50 MG: 10 INJECTION, SOLUTION EPIDURAL; INFILTRATION; INTRACAUDAL; PERINEURAL at 11:22

## 2024-01-31 RX ADMIN — SUGAMMADEX 200 MG: 100 INJECTION, SOLUTION INTRAVENOUS at 14:31

## 2024-01-31 RX ADMIN — HYDROMORPHONE HYDROCHLORIDE 0.5 MG: 1 INJECTION, SOLUTION INTRAMUSCULAR; INTRAVENOUS; SUBCUTANEOUS at 15:02

## 2024-01-31 RX ADMIN — PROPOFOL 75 MCG/KG/MIN: 10 INJECTION, EMULSION INTRAVENOUS at 12:29

## 2024-01-31 RX ADMIN — DROPERIDOL 0.62 MG: 2.5 INJECTION, SOLUTION INTRAMUSCULAR; INTRAVENOUS at 16:39

## 2024-01-31 RX ADMIN — ONDANSETRON 4 MG: 2 INJECTION INTRAMUSCULAR; INTRAVENOUS at 21:42

## 2024-01-31 RX ADMIN — CIPROFLOXACIN 400 MG: 2 INJECTION, SOLUTION INTRAVENOUS at 13:52

## 2024-01-31 RX ADMIN — CEFAZOLIN 2 G: 1 INJECTION, POWDER, FOR SOLUTION INTRAMUSCULAR; INTRAVENOUS at 11:33

## 2024-01-31 RX ADMIN — ONDANSETRON 4 MG: 2 INJECTION INTRAMUSCULAR; INTRAVENOUS at 14:14

## 2024-01-31 RX ADMIN — SODIUM CHLORIDE, POTASSIUM CHLORIDE, SODIUM LACTATE AND CALCIUM CHLORIDE: 600; 310; 30; 20 INJECTION, SOLUTION INTRAVENOUS at 12:29

## 2024-01-31 RX ADMIN — Medication 50 MCG: at 11:56

## 2024-01-31 ASSESSMENT — PAIN DESCRIPTION - DESCRIPTORS
DESCRIPTORS: ACHING;DISCOMFORT
DESCRIPTORS: ACHING;DISCOMFORT

## 2024-01-31 ASSESSMENT — PAIN DESCRIPTION - PAIN TYPE: TYPE: SURGICAL PAIN

## 2024-01-31 ASSESSMENT — PAIN SCALES - GENERAL
PAINLEVEL_OUTOF10: 2
PAINLEVEL_OUTOF10: 9
PAINLEVEL_OUTOF10: 0
PAINLEVEL_OUTOF10: 0

## 2024-01-31 ASSESSMENT — PAIN DESCRIPTION - LOCATION
LOCATION: FLANK
LOCATION: BACK
LOCATION: FLANK
LOCATION: FLANK

## 2024-01-31 ASSESSMENT — PAIN DESCRIPTION - ORIENTATION
ORIENTATION: RIGHT
ORIENTATION: POSTERIOR;LOWER
ORIENTATION: RIGHT
ORIENTATION: RIGHT

## 2024-01-31 ASSESSMENT — PAIN DESCRIPTION - FREQUENCY: FREQUENCY: CONTINUOUS

## 2024-01-31 ASSESSMENT — PULMONARY FUNCTION TESTS: PIF_VALUE: 20

## 2024-01-31 ASSESSMENT — PAIN DESCRIPTION - ONSET: ONSET: ON-GOING

## 2024-01-31 ASSESSMENT — PAIN - FUNCTIONAL ASSESSMENT: PAIN_FUNCTIONAL_ASSESSMENT: NONE - DENIES PAIN

## 2024-01-31 NOTE — OP NOTE
Operative Note      Patient: Radha Carias  YOB: 1945  MRN: [id number]    Date of Procedure: 1/31/2024    Pre-Op Diagnosis Codes:     * Right kidney stone [N20.0]    Post-Op Diagnosis: Same       Procedure(s):  Right percutaneous nephrolithotomy (2.5 cm)  Dilation of right nephrostomy tract  Right antegrade nephroscopy  Right nephrostomy exchange    Surgeon(s):  Rian Ludwig MD    Assistant:   Resident: Clem Grijalva MD    Anesthesia: General    Estimated Blood Loss (mL): 20 mL    Complications: None    Specimens:   ID Type Source Tests Collected by Time Destination   1 : KIDNEY STONE Stone (Calculus) Kidney STONE ANALYSIS Rian Ludwgi MD 1/31/2024 1421        Implants:  * No implants in log *      Drains:   Open Drain 01/31/24 Inferior;Right Back (Active)       Urinary Catheter 01/31/24 (Active)       [REMOVED] Ureteral Drain/Stent 01/31/24 (Removed)       Findings:   Left renal pelvis 1.8 cm stone, lower pole 7 mm stone    INDICATIONS FOR PROCEDURE:  The patient is a 78 y.o. female with a history of right kidney stones with total stone burden of 2.5 cm. The risks and benefits of the procedure as well as possible alternatives and complications were discussed and she consented    DETAILS OF THE PROCEDURE:  The patient underwent balloon catheter placement followed by percutaneous access with interventional radiology. Once this was completed the patient was brought back to the operating room, placed under general endotracheal anesthesia, prepped and draped in the prone position with right side bumped. She was was given appropriate antibiotic re-dosing.  A second time out was performed. The percutaneous drain was removed and a dual lumen catheter was placed over the previously placed wire. A second wire was advanced into the ureter as visualized on fluoroscopy. The catheter was removed and a skin incision was made.  The nephromax dilator was advanced into the calyx and inflated to 18 mmHg pressure

## 2024-01-31 NOTE — INTERVAL H&P NOTE
Update History & Physical    The patient's History and Physical of January 25, 2024 was reviewed with the patient and I examined the patient. There was no change. The surgical site was confirmed by the patient and me.     Plan: The risks, benefits, expected outcome, and alternative to the recommended procedure have been discussed with the patient. Patient understands and wants to proceed with the procedure.     Electronically signed by Carolina James PA-C on 1/31/2024 at 9:49 AM

## 2024-01-31 NOTE — ANESTHESIA PRE PROCEDURE
to subarachnoid sp   • LITHOTRIPSY      renal   • LITHOTRIPSY     • NECK SURGERY      [facility]; spinal cysts, cervical spine shunt - laminectomy   • NERVE SURGERY Right     peripheral nerve block wrist median right   • TONSILLECTOMY      As a child   • TUBAL LIGATION         Social History:    Social History     Tobacco Use   • Smoking status: Former     Current packs/day: 0.00     Average packs/day: 0.3 packs/day for 13.0 years (3.3 ttl pk-yrs)     Types: Cigarettes     Start date:      Quit date:      Years since quittin.1   • Smokeless tobacco: Never   Substance Use Topics   • Alcohol use: Yes     Comment: Occ.                                Counseling given: Not Answered      Vital Signs (Current):   Vitals:    24 1017   BP: (!) 165/100   Pulse: 91   Resp: 18   Temp: 97.5 °F (36.4 °C)   SpO2: 97%   Weight: 74.8 kg (165 lb)   Height: 1.575 m (5' 2\")                                              BP Readings from Last 3 Encounters:   24 (!) 165/100   24 (!) 158/83   23 120/78       NPO Status: Time of last liquid consumption:                         Time of last solid consumption:                         Date of last liquid consumption: 24                        Date of last solid food consumption: 24    BMI:   Wt Readings from Last 3 Encounters:   24 74.8 kg (165 lb)   24 74.8 kg (165 lb)   23 74.9 kg (165 lb 2 oz)     Body mass index is 30.18 kg/m².    CBC:   Lab Results   Component Value Date/Time    WBC 5.3 2024 11:08 AM    RBC 4.68 2024 11:08 AM    HGB 14.3 2024 11:08 AM    HCT 43.7 2024 11:08 AM    MCV 93.4 2024 11:08 AM    RDW 12.3 2024 11:08 AM     2024 11:08 AM       CMP:   Lab Results   Component Value Date/Time     2024 11:08 AM    K 4.2 2024 11:08 AM     2024 11:08 AM    CO2 31 2024 11:08 AM    BUN 15 2024 11:08 AM

## 2024-01-31 NOTE — ANESTHESIA PRE PROCEDURE
Department of Anesthesiology  Preprocedure Note       Name:  Radha Carias   Age:  78 y.o.  :  1945                                          MRN:  [id number]         Date:  2024      Surgeon: Surgeon(s):  Rian Ludwig MD    Procedure: Procedure(s):  HOLMIUM, PERCUTANEOUS NEPHROLITHOTOMY, LITHOCLAST, C-ARM  (PT. COMING FROM INTERVENTIONAL RAD)    Medications prior to admission:   Prior to Admission medications    Medication Sig Start Date End Date Taking? Authorizing Provider   Probiotic Product (PROBIOTIC BLEND PO) Take by mouth Daily with lunch    Ana Bennett MD   Multiple Vitamin (MULTI-VITAMIN DAILY PO) Take by mouth Daily with lunch    Ana Bennett MD   diphenhydrAMINE-APAP, sleep, (TYLENOL PM EXTRA STRENGTH)  MG tablet 1 tablet Oral bedtime 11/3/14   Ana Bennett MD   lisinopril (PRINIVIL;ZESTRIL) 10 MG tablet TAKE 1 TABLET BY MOUTH DAILY  Patient taking differently: Take 1 tablet by mouth Daily with lunch 23   Cameron Ledezma MD   ezetimibe (ZETIA) 10 MG tablet Take 1 tablet by mouth Daily with lunch 23   Ana Bennett MD   BRESPENCER ELLIPTA 100-25 MCG/ACT inhaler 1 puff daily 23   Ana Bennett MD   montelukast (SINGULAIR) 10 MG tablet Take 1 tablet by mouth Daily with lunch 23   Ana Bennett MD   albuterol sulfate HFA (PROVENTIL;VENTOLIN;PROAIR) 108 (90 Base) MCG/ACT inhaler Inhale 2 puffs into the lungs every 6 hours as needed for Wheezing    Ana Bennett MD   azelastine HCl 0.15 % SOLN by Nasal route  Patient not taking: Reported on 2024    Ana Bennett MD   albuterol (PROVENTIL) (2.5 MG/3ML) 0.083% nebulizer solution Take 3 mLs by nebulization every 6 hours as needed for Wheezing    Ana Bennett MD   meloxicam (MOBIC) 15 MG tablet TAKE 1 TABLET BY MOUTH ONCE  DAILY  Patient not taking: Reported on 2024 10/6/23   Cameron Ledezma MD       Current medications:    Current

## 2024-01-31 NOTE — PLAN OF CARE
Problem: Discharge Planning  Goal: Discharge to home or other facility with appropriate resources  Outcome: Progressing     Problem: Safety - Adult  Goal: Free from fall injury  Outcome: Progressing     Problem: Pain  Goal: Verbalizes/displays adequate comfort level or baseline comfort level  Outcome: Progressing     Problem: ABCDS Injury Assessment  Goal: Absence of physical injury  Outcome: Progressing

## 2024-01-31 NOTE — ANESTHESIA PRE PROCEDURE
Department of Anesthesiology  Preprocedure Note       Name:  Radha Carias   Age:  78 y.o.  :  1945                                          MRN:  [id number]         Date:  2024      Surgeon: * No surgeons listed *    Procedure: * No procedures listed *    Medications prior to admission:   Prior to Admission medications    Medication Sig Start Date End Date Taking? Authorizing Provider   Probiotic Product (PROBIOTIC BLEND PO) Take by mouth Daily with lunch    Ana Bennett MD   Multiple Vitamin (MULTI-VITAMIN DAILY PO) Take by mouth Daily with lunch    Ana Bennett MD   diphenhydrAMINE-APAP, sleep, (TYLENOL PM EXTRA STRENGTH)  MG tablet 1 tablet Oral bedtime 11/3/14   Ana Bennett MD   lisinopril (PRINIVIL;ZESTRIL) 10 MG tablet TAKE 1 TABLET BY MOUTH DAILY  Patient taking differently: Take 1 tablet by mouth Daily with lunch 23   Cameron Ledezma MD   ezetimibe (ZETIA) 10 MG tablet Take 1 tablet by mouth Daily with lunch 23   Ana Bennett MD   BRESPENCER ELLIPTA 100-25 MCG/ACT inhaler 1 puff daily 23   Ana Bennett MD   montelukast (SINGULAIR) 10 MG tablet Take 1 tablet by mouth Daily with lunch 23   Ana Bennett MD   albuterol sulfate HFA (PROVENTIL;VENTOLIN;PROAIR) 108 (90 Base) MCG/ACT inhaler Inhale 2 puffs into the lungs every 6 hours as needed for Wheezing    Ana Bennett MD   azelastine HCl 0.15 % SOLN by Nasal route  Patient not taking: Reported on 2024    Ana Bennett MD   albuterol (PROVENTIL) (2.5 MG/3ML) 0.083% nebulizer solution Take 3 mLs by nebulization every 6 hours as needed for Wheezing    Ana Bennett MD   meloxicam (MOBIC) 15 MG tablet TAKE 1 TABLET BY MOUTH ONCE  DAILY  Patient not taking: Reported on 2024 10/6/23   Cameron Ledezma MD       Current medications:    No current facility-administered medications for this encounter.     No current outpatient medications on file.

## 2024-01-31 NOTE — ANESTHESIA POSTPROCEDURE EVALUATION
Department of Anesthesiology  Postprocedure Note    Patient: Radha Carias  MRN: [id number]  YOB: 1945  Date of evaluation: 1/31/2024    Procedure Summary       Date: 01/31/24 Room / Location: [facility] Special Procedures    Anesthesia Start: 1229 Anesthesia Stop: 1331    Procedure: IR NEPHROSTOMY PERCUTANEOUS RIGHT Diagnosis: (right wire to bladder)    Scheduled Providers: Radiologist, Stv Interventional Responsible Provider: Michael Huitron MD    Anesthesia Type: general ASA Status: 3            Anesthesia Type: No value filed.    Joseph Phase I:      Joseph Phase II:      Anesthesia Post Evaluation    Patient location during evaluation: ICU  Patient participation: complete - patient cannot participate  Level of consciousness: sedated and ventilated  Airway patency: patent  Nausea & Vomiting: no nausea and no vomiting  Cardiovascular status: hemodynamically stable  Respiratory status: intubated and ventilator  Hydration status: euvolemic  Comments: Patient transferred from IR suite to OR intubated, sedated, and hemodynamically stable.  Pain management: adequate    No notable events documented.

## 2024-01-31 NOTE — ADDENDUM NOTE
Addendum  created 01/31/24 1249 by Johnie Padron APRN - [facility]    Intraprocedure Meds edited

## 2024-01-31 NOTE — OP NOTE
Operative Note      Patient: Radha Carias  YOB: 1945  MRN: [id number]    Date of Procedure: 1/31/2024    Pre-Op Diagnosis Codes:     * Right kidney stone [N20.0]    Post-Op Diagnosis: Same       Procedure(s):  CYSTOSCOPY INSERTION OCCLUSIVE BALLOON  (PT. GOING TO INTERVENTIONAL RAD), RIGHT    Surgeon(s):  Rian Ludwig MD    Assistant:   Resident: Clem Grijalva MD    Anesthesia: General    Estimated Blood Loss (mL): Minimal    Complications: None    Specimens:   * No specimens in log *    Implants:  * No implants in log *      Drains: * No LDAs found *    INDICATIONS FOR PROCEDURE:  The patient is a 78 y.o. female with a history of right kidney stone requiring percutaneous nephrolithotomy.  She is currently here to undergo occlusion balloon catheter placement prior to percutaneous access. The risks and benefits of the procedure as well as possible alternatives and complications were discussed and she consented    DETAILS OF THE PROCEDURE:  The patient was correctly identified in the preoperative holding area.  she was brought back to the operating room and placed in the dorsal lithotomy position. EPC cuffs were in place, turned on, and fully functional. General endotracheal anesthesia was administered.  She was given Ancef 2gm IV  for antibiotic prophylaxis.The patient was then prepped and draped in the usual sterile fashion.  After appropriate time-out was performed with all parties agreeing, 22 Malian cystoscope with a 30 degree lens was inserted through the urethra into the bladder.     The right ureteral orifice was localized. A glidewire was inserted through the scope into the ureteral orifice and localized in the right renal pelvis under fluoroscopy. An occlusion balloon catheter was placed over the wire into into the pelvis. It was localized in the correct position under fluoroscopy. A retrograde pyelogram was performed to delineate the collecting system.    A 16 Malian Osborne catheter was

## 2024-01-31 NOTE — ANESTHESIA POSTPROCEDURE EVALUATION
Department of Anesthesiology  Postprocedure Note    Patient: Radha Carias  MRN: [id number]  YOB: 1945  Date of evaluation: 1/31/2024    Procedure Summary       Date: 01/31/24 Room / Location: [address]    Anesthesia Start: 1120 Anesthesia Stop: 1219    Procedure: CYSTOSCOPY INSERTION OCCLUSIVE BALLOON  (PT. GOING TO INTERVENTIONAL RAD) (Right) Diagnosis:       Right kidney stone      (Right kidney stone [N20.0])    Surgeons: Rian Ludwig MD Responsible Provider: Michael Huitron MD    Anesthesia Type: general ASA Status: 3            Anesthesia Type: No value filed.    Joseph Phase I: Joseph Score: 4    Joseph Phase II:      Anesthesia Post Evaluation    Patient location during evaluation: ICU  Patient participation: complete - patient cannot participate  Level of consciousness: sedated and ventilated  Airway patency: patent  Nausea & Vomiting: no nausea and no vomiting  Cardiovascular status: hemodynamically stable  Respiratory status: intubated and ventilator  Hydration status: euvolemic  Comments: BP (!) 88/52   Pulse 58   Temp 96.8 °F (36 °C) (Temporal)   Resp 16   Ht 1.575 m (5' 2\")   Wt 74.8 kg (165 lb)   SpO2 97%   BMI 30.18 kg/m²     Patient transported to PACU temporarily intubated and sedated, before being transported to IR suite for part II.  Pain management: adequate    No notable events documented.

## 2024-01-31 NOTE — BRIEF OP NOTE
Brief Postoperative Note Wire To Bladder    Radha Carias  YOB: 1945  [id number]    Pre-operative Diagnosis: Nephrolithiasis       Post-operative Diagnosis: Same    Procedure: Wire to bladder    Medication Given:  Local     Anesthesia: 1%Lidocaine     Surgeons/Assistants: MD Milton    Estimated Blood Loss: Minimal    Complications: none    Successful right wire to bladder through mid pole access.      Electronically signed by VANDANA Rogel on 1/31/2024 at 1:32 PM

## 2024-02-01 ENCOUNTER — APPOINTMENT (OUTPATIENT)
Dept: CT IMAGING | Age: 79
End: 2024-02-01
Attending: UROLOGY
Payer: MEDICARE

## 2024-02-01 VITALS
DIASTOLIC BLOOD PRESSURE: 52 MMHG | TEMPERATURE: 99 F | BODY MASS INDEX: 30.36 KG/M2 | SYSTOLIC BLOOD PRESSURE: 131 MMHG | HEIGHT: 62 IN | WEIGHT: 165 LBS | OXYGEN SATURATION: 94 % | HEART RATE: 82 BPM | RESPIRATION RATE: 16 BRPM

## 2024-02-01 LAB
ANION GAP SERPL CALCULATED.3IONS-SCNC: 10 MMOL/L (ref 9–17)
BUN SERPL-MCNC: 19 MG/DL (ref 8–23)
CALCIUM SERPL-MCNC: 8.5 MG/DL (ref 8.6–10.4)
CHLORIDE SERPL-SCNC: 106 MMOL/L (ref 98–107)
CO2 SERPL-SCNC: 23 MMOL/L (ref 20–31)
CREAT SERPL-MCNC: 1 MG/DL (ref 0.5–0.9)
ERYTHROCYTE [DISTWIDTH] IN BLOOD BY AUTOMATED COUNT: 12.6 % (ref 11.8–14.4)
GFR SERPL CREATININE-BSD FRML MDRD: 58 ML/MIN/1.73M2
GLUCOSE SERPL-MCNC: 140 MG/DL (ref 70–99)
HCT VFR BLD AUTO: 37.4 % (ref 36.3–47.1)
HGB BLD-MCNC: 11.8 G/DL (ref 11.9–15.1)
MCH RBC QN AUTO: 30.7 PG (ref 25.2–33.5)
MCHC RBC AUTO-ENTMCNC: 31.6 G/DL (ref 28.4–34.8)
MCV RBC AUTO: 97.4 FL (ref 82.6–102.9)
NRBC BLD-RTO: 0 PER 100 WBC
PLATELET # BLD AUTO: 193 K/UL (ref 138–453)
PMV BLD AUTO: 10.5 FL (ref 8.1–13.5)
POTASSIUM SERPL-SCNC: 4.5 MMOL/L (ref 3.7–5.3)
RBC # BLD AUTO: 3.84 M/UL (ref 3.95–5.11)
SODIUM SERPL-SCNC: 139 MMOL/L (ref 135–144)
WBC OTHER # BLD: 20.5 K/UL (ref 3.5–11.3)

## 2024-02-01 PROCEDURE — 6370000000 HC RX 637 (ALT 250 FOR IP): Performed by: STUDENT IN AN ORGANIZED HEALTH CARE EDUCATION/TRAINING PROGRAM

## 2024-02-01 PROCEDURE — 36415 COLL VENOUS BLD VENIPUNCTURE: CPT

## 2024-02-01 PROCEDURE — 51798 US URINE CAPACITY MEASURE: CPT

## 2024-02-01 PROCEDURE — 74176 CT ABD & PELVIS W/O CONTRAST: CPT

## 2024-02-01 PROCEDURE — 6360000002 HC RX W HCPCS: Performed by: STUDENT IN AN ORGANIZED HEALTH CARE EDUCATION/TRAINING PROGRAM

## 2024-02-01 PROCEDURE — 80048 BASIC METABOLIC PNL TOTAL CA: CPT

## 2024-02-01 PROCEDURE — G0378 HOSPITAL OBSERVATION PER HR: HCPCS

## 2024-02-01 PROCEDURE — 85027 COMPLETE CBC AUTOMATED: CPT

## 2024-02-01 RX ORDER — TAMSULOSIN HYDROCHLORIDE 0.4 MG/1
0.4 CAPSULE ORAL DAILY
Qty: 30 CAPSULE | Refills: 0 | Status: SHIPPED | OUTPATIENT
Start: 2024-02-01

## 2024-02-01 RX ORDER — HYDROCODONE BITARTRATE AND ACETAMINOPHEN 5; 325 MG/1; MG/1
1 TABLET ORAL EVERY 4 HOURS PRN
Qty: 10 TABLET | Refills: 0 | Status: SHIPPED | OUTPATIENT
Start: 2024-02-01 | End: 2024-02-04

## 2024-02-01 RX ORDER — KETOROLAC TROMETHAMINE 15 MG/ML
15 INJECTION, SOLUTION INTRAMUSCULAR; INTRAVENOUS ONCE
Status: COMPLETED | OUTPATIENT
Start: 2024-02-01 | End: 2024-02-01

## 2024-02-01 RX ORDER — PROMETHAZINE HYDROCHLORIDE 25 MG/ML
12.5 INJECTION, SOLUTION INTRAMUSCULAR; INTRAVENOUS EVERY 6 HOURS PRN
Status: DISCONTINUED | OUTPATIENT
Start: 2024-02-01 | End: 2024-02-01 | Stop reason: HOSPADM

## 2024-02-01 RX ORDER — CEFADROXIL 500 MG/1
500 CAPSULE ORAL 2 TIMES DAILY
Qty: 6 CAPSULE | Refills: 0 | Status: SHIPPED | OUTPATIENT
Start: 2024-02-01 | End: 2024-02-04

## 2024-02-01 RX ORDER — TAMSULOSIN HYDROCHLORIDE 0.4 MG/1
0.4 CAPSULE ORAL DAILY
Status: DISCONTINUED | OUTPATIENT
Start: 2024-02-01 | End: 2024-02-01 | Stop reason: HOSPADM

## 2024-02-01 RX ORDER — HYDROCODONE BITARTRATE AND ACETAMINOPHEN 5; 325 MG/1; MG/1
1 TABLET ORAL EVERY 4 HOURS PRN
Qty: 10 TABLET | Refills: 0 | Status: SHIPPED | OUTPATIENT
Start: 2024-02-01 | End: 2024-02-01

## 2024-02-01 RX ORDER — METHOCARBAMOL 500 MG/1
500 TABLET, FILM COATED ORAL 4 TIMES DAILY
Qty: 40 TABLET | Refills: 0 | Status: SHIPPED | OUTPATIENT
Start: 2024-02-01 | End: 2024-02-11

## 2024-02-01 RX ORDER — ACETAMINOPHEN 500 MG
1000 TABLET ORAL EVERY 6 HOURS SCHEDULED
Status: DISCONTINUED | OUTPATIENT
Start: 2024-02-01 | End: 2024-02-01 | Stop reason: HOSPADM

## 2024-02-01 RX ADMIN — Medication 2000 MG: at 05:30

## 2024-02-01 RX ADMIN — ACETAMINOPHEN 650 MG: 325 TABLET ORAL at 00:09

## 2024-02-01 RX ADMIN — METHOCARBAMOL 500 MG: 500 TABLET ORAL at 09:42

## 2024-02-01 RX ADMIN — TAMSULOSIN HYDROCHLORIDE 0.4 MG: 0.4 CAPSULE ORAL at 09:42

## 2024-02-01 RX ADMIN — KETOROLAC TROMETHAMINE 15 MG: 15 INJECTION, SOLUTION INTRAMUSCULAR; INTRAVENOUS at 07:55

## 2024-02-01 RX ADMIN — TROSPIUM CHLORIDE 20 MG: 20 TABLET, FILM COATED ORAL at 05:42

## 2024-02-01 RX ADMIN — ONDANSETRON 4 MG: 2 INJECTION INTRAMUSCULAR; INTRAVENOUS at 06:37

## 2024-02-01 RX ADMIN — OXYCODONE 5 MG: 5 TABLET ORAL at 00:14

## 2024-02-01 RX ADMIN — ACETAMINOPHEN 650 MG: 325 TABLET ORAL at 05:42

## 2024-02-01 RX ADMIN — METHOCARBAMOL 500 MG: 500 TABLET ORAL at 00:09

## 2024-02-01 ASSESSMENT — PAIN DESCRIPTION - PAIN TYPE: TYPE: SURGICAL PAIN

## 2024-02-01 ASSESSMENT — PAIN SCALES - GENERAL
PAINLEVEL_OUTOF10: 6
PAINLEVEL_OUTOF10: 0
PAINLEVEL_OUTOF10: 7
PAINLEVEL_OUTOF10: 7
PAINLEVEL_OUTOF10: 3

## 2024-02-01 ASSESSMENT — PAIN DESCRIPTION - DESCRIPTORS: DESCRIPTORS: ACHING;DISCOMFORT

## 2024-02-01 ASSESSMENT — PAIN DESCRIPTION - ORIENTATION: ORIENTATION: RIGHT

## 2024-02-01 ASSESSMENT — PAIN DESCRIPTION - LOCATION: LOCATION: FLANK

## 2024-02-01 NOTE — PLAN OF CARE
Problem: Discharge Planning  Goal: Discharge to home or other facility with appropriate resources  2/1/2024 1312 by Marie Pickens RN  Outcome: Adequate for Discharge  2/1/2024 0913 by Marie Pickens RN  Outcome: Progressing     Problem: Safety - Adult  Goal: Free from fall injury  2/1/2024 1312 by Marie Pickens RN  Outcome: Adequate for Discharge  2/1/2024 0913 by Marie Pickens RN  Outcome: Progressing     Problem: Pain  Goal: Verbalizes/displays adequate comfort level or baseline comfort level  2/1/2024 1312 by Marie Pickens RN  Outcome: Adequate for Discharge  2/1/2024 0913 by Marie Pickens RN  Outcome: Progressing     Problem: ABCDS Injury Assessment  Goal: Absence of physical injury  2/1/2024 1312 by Marie Pickens RN  Outcome: Adequate for Discharge  2/1/2024 0913 by Marie Pickens RN  Outcome: Progressing

## 2024-02-01 NOTE — PROGRESS NOTES
Oziel Perales, Ivory, Donna Richards, Huseyin Carlos Jr  Urology Progress Note     Subjective: s/p R PCNL 1/31/24  No acute events overnight  AFVSS  Having some pain but well controlled  Nephrostomy tube clamped at bedside    Cr 1.0 (0.7)  Hb 11.8 (13.6)    Patient Vitals for the past 24 hrs:   BP Temp Temp src Pulse Resp SpO2 Height Weight   02/01/24 0430 (!) 114/52 98.5 °F (36.9 °C) Oral 67 16 94 % -- --   02/01/24 0000 (!) 129/58 98.5 °F (36.9 °C) Oral 66 16 98 % -- --   01/31/24 1939 133/64 98.9 °F (37.2 °C) Oral 92 18 -- -- --   01/31/24 1815 -- -- -- -- -- -- 1.575 m (5' 2\") 74.8 kg (165 lb)   01/31/24 1807 (!) 121/48 97.6 °F (36.4 °C) Oral 99 16 97 % -- --   01/31/24 1700 (!) 141/49 -- -- 89 15 95 % -- --   01/31/24 1645 (!) 140/76 -- -- 88 15 96 % -- --   01/31/24 1630 (!) 145/85 -- -- 97 19 96 % -- --   01/31/24 1615 (!) 145/85 97.5 °F (36.4 °C) -- 78 -- -- -- --   01/31/24 1605 (!) 156/82 -- -- 97 -- -- -- --   01/31/24 1515 -- -- -- 96 21 -- -- --   01/31/24 1502 -- -- -- -- 21 -- -- --   01/31/24 1500 (!) 146/86 -- -- 93 17 97 % -- --   01/31/24 1436 139/88 97.7 °F (36.5 °C) Temporal -- 15 -- -- --   01/31/24 1216 (!) 88/52 -- -- 58 16 97 % -- --   01/31/24 1215 (!) 87/50 -- -- 60 16 97 % -- --   01/31/24 1210 (!) 91/53 96.8 °F (36 °C) Temporal 61 16 97 % -- --   01/31/24 1017 (!) 165/100 97.5 °F (36.4 °C) -- 91 18 97 % 1.575 m (5' 2\") 74.8 kg (165 lb)       Intake/Output Summary (Last 24 hours) at 2/1/2024 0747  Last data filed at 2/1/2024 0450  Gross per 24 hour   Intake 1500 ml   Output 900 ml   Net 600 ml       Recent Labs     01/31/24  1556 02/01/24  0614   WBC 9.0 20.5*   HGB 13.6 11.8*   HCT 42.3 37.4   MCV 94.4 97.4    193     Recent Labs     01/31/24  1556 02/01/24  0614    139   K 4.2 4.5    106   CO2 25 23   BUN 15 19   CREATININE 0.7 1.0*       No results for input(s): \"COLORU\", \"PHUR\", \"LABCAST\", \"WBCUA\", \"RBCUA\", \"MUCUS\", \"TRICHOMONAS\", \"YEAST\", \"BACTERIA\",

## 2024-02-01 NOTE — DISCHARGE INSTRUCTIONS
Pt ok to discharge home in good condition  Pt should avoid strenuous activity   Pt should walk moderately at home  Pt ok to shower  Pt may continue regular diet   Please call attending physician or hospital  with questions  Call or Present to ED if fever (> 101F), intractable nausea vomiting or pain.  Rx in chart     Pt should follow up with Dr. Ludwig in office, call to confirm appointment    You may notice blood in the urine, burning with urination, urgency, and frequency of urination after this procedure-this is expected.  This should resolve over time.    If you were sent home with antibiotics please take these to completion without missing any doses.

## 2024-02-01 NOTE — ANESTHESIA POSTPROCEDURE EVALUATION
Department of Anesthesiology  Postprocedure Note    Patient: Radha Carias  MRN: [id number]  YOB: 1945  Date of evaluation: 1/31/2024    Procedure Summary     Date: 01/31/24 Room / Location: [address]    Anesthesia Start: 1344 Anesthesia Stop: 1443    Procedure: HOLMIUM, PERCUTANEOUS NEPHROLITHOTOMY, LITHOCLAST, C-ARM  (PT. COMING FROM INTERVENTIONAL RAD) (Right) Diagnosis:       Right kidney stone      (Right kidney stone [N20.0])    Surgeons: Rian Ludwig MD Responsible Provider: Michael Huitron MD    Anesthesia Type: general ASA Status: 3          Anesthesia Type: No value filed.    Joseph Phase I: Joseph Score: 10    Joseph Phase II:      Anesthesia Post Evaluation    Patient location during evaluation: PACU  Patient participation: complete - patient participated  Level of consciousness: awake  Airway patency: patent  Nausea & Vomiting: no nausea and no vomiting  Cardiovascular status: blood pressure returned to baseline  Respiratory status: acceptable  Hydration status: euvolemic  Comments: /64   Pulse 92   Temp 98.9 °F (37.2 °C) (Oral)   Resp 18   Ht 1.575 m (5' 2\")   Wt 74.8 kg (165 lb)   SpO2 98%   BMI 30.18 kg/m²   Pain Assessment: 0-10 Pain Level: 2        No notable events documented.

## 2024-02-01 NOTE — PROGRESS NOTES
Writer called the outpatient pharmacy to let them know that the patient would like her prescriptions sent to her pharmacy which is the [facility] in [place]. Pharmacy Tech confirmed that they will send them in 30 minutes to a hour.

## 2024-02-01 NOTE — PROGRESS NOTES
Writer went over AVS with the patient. Patient verbalizes understanding of new medications, follow up appts, and post op care. PIV was removed and personal belongings were packed up by the patient. Patient requested to ambulate off the unit with her .

## 2024-02-02 ENCOUNTER — CARE COORDINATION (OUTPATIENT)
Dept: CASE MANAGEMENT | Age: 79
End: 2024-02-02

## 2024-02-02 DIAGNOSIS — Z98.890 S/P CYSTOSCOPY: ICD-10-CM

## 2024-02-02 DIAGNOSIS — G89.18 ACUTE POST-OPERATIVE PAIN: ICD-10-CM

## 2024-02-02 DIAGNOSIS — N20.0 RIGHT KIDNEY STONE: Primary | ICD-10-CM

## 2024-02-02 PROCEDURE — 1111F DSCHRG MED/CURRENT MED MERGE: CPT | Performed by: FAMILY MEDICINE

## 2024-02-02 NOTE — CARE COORDINATION
Care Transitions Initial Follow Up Call    Call within 2 business days of discharge: Yes    Patient Current Location:  Home: [address] [place] [id number]    LPN Care Coordinator contacted the patient by telephone to perform post hospital discharge assessment. Verified name and  with patient as identifiers. Provided introduction to self, and explanation of the LPN Care Coordinator role.     Patient: Radha Carias Patient : 1945   MRN: [id number]  Reason for Admission: Acute post-operative   Discharge Date: 24 RARS: No data recorded    Last Discharge Facility       Date Complaint Diagnosis Description Type Department Provider    24  Acute post-operative pain ... Admission (Discharged) [facility] Rian Ludwig MD            Was this an external facility discharge? No Discharge Facility: [facility] [place]    Challenges to be reviewed by the provider   Additional needs identified to be addressed with provider: No                 Method of communication with provider: none.    Transitions of Care Initial Call:  Explained the role of Care Transition Nurse and the Transition program, patient is agreeable to follow up calls Post discharge from the Hospital.     Spoke to Radha today. She reports that she is doing okay, did not sleep the best but will get a nap in later. Stated I feel a bit groggy from muscle relaxer. She denies HA, sob, CP, N/VD. Writer informed pt that this can happen and that she need to get a good night rest to sleep it off. She did not  norco stated that she probably will not  , has been using tylenol for pain. Incision covered with dry dressing. Discussed s/s of infection she denies fever or chills. She is using IS ambulating okay. She reports bladder spasms and has hematuria but knows it is to be expected. Med rec completed 1111F she is not taking Flomax at this time. Discussed post-op instructions.     Writer scheduled HFU with pcp she will call Dr. Ludwig for urology

## 2024-02-06 LAB
STONE COMPOSITION: NORMAL
STONE DESCRIPTION: NORMAL
STONE MASS: 1348 MG

## 2024-02-07 ENCOUNTER — OFFICE VISIT (OUTPATIENT)
Age: 79
End: 2024-02-07

## 2024-02-07 VITALS
DIASTOLIC BLOOD PRESSURE: 88 MMHG | TEMPERATURE: 97.6 F | HEIGHT: 62 IN | WEIGHT: 166.2 LBS | HEART RATE: 70 BPM | OXYGEN SATURATION: 96 % | SYSTOLIC BLOOD PRESSURE: 140 MMHG | BODY MASS INDEX: 30.59 KG/M2

## 2024-02-07 DIAGNOSIS — Z09 HOSPITAL DISCHARGE FOLLOW-UP: ICD-10-CM

## 2024-02-07 DIAGNOSIS — N20.0 CALCULUS OF KIDNEY: Primary | ICD-10-CM

## 2024-02-07 ASSESSMENT — PATIENT HEALTH QUESTIONNAIRE - PHQ9
2. FEELING DOWN, DEPRESSED OR HOPELESS: 0
6. FEELING BAD ABOUT YOURSELF - OR THAT YOU ARE A FAILURE OR HAVE LET YOURSELF OR YOUR FAMILY DOWN: 0
5. POOR APPETITE OR OVEREATING: 0
1. LITTLE INTEREST OR PLEASURE IN DOING THINGS: 0
SUM OF ALL RESPONSES TO PHQ QUESTIONS 1-9: 0
8. MOVING OR SPEAKING SO SLOWLY THAT OTHER PEOPLE COULD HAVE NOTICED. OR THE OPPOSITE, BEING SO FIGETY OR RESTLESS THAT YOU HAVE BEEN MOVING AROUND A LOT MORE THAN USUAL: 0
10. IF YOU CHECKED OFF ANY PROBLEMS, HOW DIFFICULT HAVE THESE PROBLEMS MADE IT FOR YOU TO DO YOUR WORK, TAKE CARE OF THINGS AT HOME, OR GET ALONG WITH OTHER PEOPLE: 0
SUM OF ALL RESPONSES TO PHQ QUESTIONS 1-9: 0
SUM OF ALL RESPONSES TO PHQ9 QUESTIONS 1 & 2: 0
SUM OF ALL RESPONSES TO PHQ QUESTIONS 1-9: 0
3. TROUBLE FALLING OR STAYING ASLEEP: 0
4. FEELING TIRED OR HAVING LITTLE ENERGY: 0
SUM OF ALL RESPONSES TO PHQ QUESTIONS 1-9: 0
9. THOUGHTS THAT YOU WOULD BE BETTER OFF DEAD, OR OF HURTING YOURSELF: 0
7. TROUBLE CONCENTRATING ON THINGS, SUCH AS READING THE NEWSPAPER OR WATCHING TELEVISION: 0

## 2024-02-07 NOTE — PROGRESS NOTES
[facility]     Date of Visit:  2024  Patient Name: Radha Carias   Patient :  1945     CHIEF COMPLAINT/HPI:     Radha Carias is a 78 y.o. female who presents today for an general visit to be evaluated for the following condition(s):  Chief Complaint   Patient presents with    Follow-Up from Hospital     She is here for her hospital f/u from [facility] on 2024 and discharged on 2024 for a kidney stones on the right.  The largest stone was 21 mm.   Due to recurrent urinary tract infections patient was referred to urology and was found to have a 2 cm stone in the right kidney.  This was most likely the nidus of her recurrent infections.  She underwent lithotripsy procedure percutaneously which was very successful.  She was discharged a week ago.  She is still little tired at the end of the day most likely from the anesthetic she is not taking any pain medication she is urinating well and having bowel movements appetite is good    REVIEW OF SYSTEM      Review of Systems  No fever no sweats no chills she did have urinary frequency after the procedure but that has resolved she is having bowel movements not much in the way of back pain    REVIEWED INFORMATION      Allergies   Allergen Reactions    Adhesive Tape Rash and Other (See Comments)     Blisters    Milk-Related Compounds     Septra [Sulfamethoxazole-Trimethoprim]     Sulfa Antibiotics Headaches     MIGRAINE      Eggs Or Egg-Derived Products     Wheat        Current Outpatient Medications   Medication Sig Note Dispense Refill    Probiotic Product (PROBIOTIC BLEND PO) Take by mouth Daily with lunch       Multiple Vitamin (MULTI-VITAMIN DAILY PO) Take by mouth Daily with lunch 2024: Has not resumed yet       diphenhydrAMINE-APAP, sleep, (TYLENOL PM EXTRA STRENGTH)  MG tablet 1 tablet Oral bedtime       lisinopril (PRINIVIL;ZESTRIL) 10 MG tablet TAKE 1 TABLET BY MOUTH DAILY (Patient taking differently: Take 1 tablet by mouth

## 2024-02-08 ENCOUNTER — CARE COORDINATION (OUTPATIENT)
Dept: CASE MANAGEMENT | Age: 79
End: 2024-02-08

## 2024-02-08 NOTE — CARE COORDINATION
Care Transitions Follow Up Call    Patient Current Location:  Home: [address] [place] [id number]    Care Transition Nurse contacted the patient by telephone to follow up after admission on 24.  Verified name and  with patient as identifiers.    Patient: Radha Carias  Patient : 1945   MRN: [id number]  Reason for Admission: cystoscopy, urinary stent placement for right kidney stone  Discharge Date: 24 RARS: No data recorded    Needs to be reviewed by the provider   Additional needs identified to be addressed with provider: No  none             Method of communication with provider: none.    Spoke to Radha for transitions call. Patient went to PCP yesterday, no concerns. Pt has appt with Urology Dr Ludwig in 2 weeks. Not taking Robaxin or Norco, not taking Flomax, stated she has good UOP, doesn't feel she needs it. No concerns at this time.    Addressed changes since last contact:   PCP HFU yesterday, has Urology in 2 weeks  Discussed follow-up appointments.     Follow Up  Future Appointments   Date Time Provider Department Center   2024  9:00 AM Camerno Ledezma MD [facility]         Care Transition Nurse reviewed discharge instructions with patient and discussed any barriers to care and/or understanding of plan of care after discharge. Discussed appropriate site of care based on symptoms and resources available to patient including: PCP  Specialist  When to call 911  [employer] [place] Messaging. The patient agrees to contact the PCP office for questions related to their healthcare.       Offered patient enrollment in the Remote Patient Monitoring (RPM) program for in-home monitoring: Patient is not eligible for RPM program.     Care Transitions Subsequent and Final Call    Subsequent and Final Calls  Do you have any ongoing symptoms?: No  Have your medications changed?: No  Do you have any questions related to your medications?: No  Do you currently have any active services?: No  Do

## 2024-02-16 ENCOUNTER — CARE COORDINATION (OUTPATIENT)
Dept: CASE MANAGEMENT | Age: 79
End: 2024-02-16

## 2024-02-16 NOTE — CARE COORDINATION
Care Transitions Outreach Attempt #1    Attempted to reach patient for transitions of care follow up. Unable to reach patient. HIPAA compliant message left on  requesting a return call.    Patient: Radha Carias Patient : 1945 MRN: [id number]    Last Discharge Facility       Date Complaint Diagnosis Description Type Department Provider    24  Acute post-operative pain ... Admission (Discharged) [facility] Rian Ludwig MD              Was this an external facility discharge? No Discharge Facility: [facility]    Noted following upcoming appointments from discharge chart review:   [facility] follow up appointment(s):   Future Appointments   Date Time Provider Department Center   2024  9:00 AM [facility] L[facility]  Care Transition Nurse

## 2024-02-20 ENCOUNTER — CARE COORDINATION (OUTPATIENT)
Dept: CASE MANAGEMENT | Age: 79
End: 2024-02-20

## 2024-02-20 NOTE — CARE COORDINATION
Care Transitions Outreach Attempt #2       Attempted to reach patient for transitions of care follow up. Unable to reach patient. HIPAA compliant message left on  requesting a return call. Will end care transitions if no return call received.    Patient: Radha Carias Patient : 1945 MRN: [id number]    Last Discharge Facility       Date Complaint Diagnosis Description Type Department Provider    24  Acute post-operative pain ... Admission (Discharged) [facility] Rian Ludwig MD              Was this an external facility discharge? No Discharge Facility: [facility]    Noted following upcoming appointments from discharge chart review:   [facility] follow up appointment(s):   Future Appointments   Date Time Provider Department Center   2024  9:00 AM [facility]        Ayala Loaiza LPN  Care Transition Nurse

## 2024-03-13 RX ORDER — MONTELUKAST SODIUM 10 MG/1
10 TABLET ORAL DAILY
Qty: 100 TABLET | Refills: 2 | Status: SHIPPED | OUTPATIENT
Start: 2024-03-13

## 2024-03-13 NOTE — TELEPHONE ENCOUNTER
Radha Carias is calling to request a refill on the following medication(s):    Medication Request:  Requested Prescriptions     Pending Prescriptions Disp Refills    montelukast (SINGULAIR) 10 MG tablet [Pharmacy Med Name: Montelukast Sodium 10 MG Oral Tablet] 100 tablet 2     Sig: TAKE 1 TABLET BY MOUTH ONCE  DAILY       Last Visit Date (If Applicable):  2/7/2024    Next Visit Date:    5/9/2024

## 2024-05-13 ENCOUNTER — HOSPITAL ENCOUNTER (OUTPATIENT)
Age: 79
Setting detail: SPECIMEN
Discharge: HOME | End: 2024-05-13

## 2024-05-13 DIAGNOSIS — E78.2 MIXED HYPERLIPIDEMIA: ICD-10-CM

## 2024-05-13 DIAGNOSIS — N20.0 KIDNEY STONES: ICD-10-CM

## 2024-05-13 DIAGNOSIS — I10 PRIMARY HYPERTENSION: ICD-10-CM

## 2024-05-13 LAB
ALBUMIN SERPL-MCNC: 4.3 G/DL (ref 3.5–5.2)
ALBUMIN/GLOB SERPL: 2 {RATIO} (ref 1–2.5)
ALP SERPL-CCNC: 97 U/L (ref 35–104)
ALT SERPL-CCNC: 24 U/L (ref 10–35)
ANION GAP SERPL CALCULATED.3IONS-SCNC: 11 MMOL/L (ref 9–16)
AST SERPL-CCNC: 28 U/L (ref 10–35)
BILIRUB DIRECT SERPL-MCNC: <0.2 MG/DL (ref 0–0.3)
BILIRUB INDIRECT SERPL-MCNC: NORMAL MG/DL (ref 0–1)
BILIRUB SERPL-MCNC: 0.4 MG/DL (ref 0–1.2)
BUN SERPL-MCNC: 17 MG/DL (ref 8–23)
CALCIUM SERPL-MCNC: 9.3 MG/DL (ref 8.6–10.4)
CHLORIDE SERPL-SCNC: 105 MMOL/L (ref 98–107)
CHOLEST SERPL-MCNC: 221 MG/DL (ref 0–199)
CHOLESTEROL/HDL RATIO: 3
CO2 SERPL-SCNC: 25 MMOL/L (ref 20–31)
CREAT SERPL-MCNC: 0.8 MG/DL (ref 0.5–0.9)
ERYTHROCYTE [DISTWIDTH] IN BLOOD BY AUTOMATED COUNT: 12.5 % (ref 11.8–14.4)
GFR, ESTIMATED: 77 ML/MIN/1.73M2
GLUCOSE SERPL-MCNC: 86 MG/DL (ref 74–99)
HCT VFR BLD AUTO: 41.4 % (ref 36.3–47.1)
HDLC SERPL-MCNC: 64 MG/DL
HGB BLD-MCNC: 13.3 G/DL (ref 11.9–15.1)
LDLC SERPL CALC-MCNC: 140 MG/DL (ref 0–100)
MCH RBC QN AUTO: 30.2 PG (ref 25.2–33.5)
MCHC RBC AUTO-ENTMCNC: 32.1 G/DL (ref 28.4–34.8)
MCV RBC AUTO: 93.9 FL (ref 82.6–102.9)
NRBC BLD-RTO: 0 PER 100 WBC
PLATELET # BLD AUTO: 258 K/UL (ref 138–453)
PMV BLD AUTO: 10.7 FL (ref 8.1–13.5)
POTASSIUM SERPL-SCNC: 4.3 MMOL/L (ref 3.7–5.3)
PROT SERPL-MCNC: 7.1 G/DL (ref 6.6–8.7)
RBC # BLD AUTO: 4.41 M/UL (ref 3.95–5.11)
SODIUM SERPL-SCNC: 141 MMOL/L (ref 136–145)
TRIGL SERPL-MCNC: 86 MG/DL
VLDLC SERPL CALC-MCNC: 17 MG/DL
WBC OTHER # BLD: 4.4 K/UL (ref 3.5–11.3)

## 2024-05-16 ENCOUNTER — OFFICE VISIT (OUTPATIENT)
Age: 79
End: 2024-05-16

## 2024-05-16 VITALS
DIASTOLIC BLOOD PRESSURE: 82 MMHG | BODY MASS INDEX: 30.59 KG/M2 | WEIGHT: 166.2 LBS | OXYGEN SATURATION: 97 % | SYSTOLIC BLOOD PRESSURE: 142 MMHG | HEART RATE: 70 BPM | HEIGHT: 62 IN

## 2024-05-16 DIAGNOSIS — I10 PRIMARY HYPERTENSION: Primary | ICD-10-CM

## 2024-05-16 DIAGNOSIS — N20.0 CALCULUS OF KIDNEY: ICD-10-CM

## 2024-05-16 DIAGNOSIS — E78.2 MIXED HYPERLIPIDEMIA: ICD-10-CM

## 2024-05-16 DIAGNOSIS — N20.0 KIDNEY STONES: ICD-10-CM

## 2024-05-16 RX ORDER — MELOXICAM 15 MG/1
15 TABLET ORAL DAILY
Qty: 90 TABLET | Refills: 2 | Status: SHIPPED | OUTPATIENT
Start: 2024-05-16

## 2024-05-16 NOTE — PROGRESS NOTES
[facility]     Date of Visit:  2024  Patient Name: Radha Carias   Patient :  1945     CHIEF COMPLAINT/HPI:     Radha Carias is a 78 y.o. female who presents today for an general visit to be evaluated for the following condition(s):  Chief Complaint   Patient presents with    Routine    Blood Work     Patient has been doing well since lithotripsy no new urinary infections infection he is feeling quite well renal functions are normal.  She is having trouble with myofascial pain and would like to take Mobic again this will be restarted but we will continue to monitor renal functions    Lab Results   Component Value Date/Time     2024 09:58 AM    K 4.3 2024 09:58 AM     2024 09:58 AM    CO2 25 2024 09:58 AM    BUN 17 2024 09:58 AM    CREATININE 0.8 2024 09:58 AM    GLUCOSE 86 2024 09:58 AM    CALCIUM 9.3 2024 09:58 AM    LABGLOM 77 2024 09:58 AM    LABGLOM 58 2024 06:14 AM        No results found for: \"MALBCR\"     Lab Results   Component Value Date    CHOL 221 (H) 2024    TRIG 86 2024    HDL 64 2024    VLDL 17 2024    CHOLHDLRATIO 3.0 2024        Lab Results   Component Value Date    WBC 4.4 2024    HGB 13.3 2024    HCT 41.4 2024    MCV 93.9 2024     2024       Lab Results   Component Value Date    ALT 24 2024    AST 28 2024    ALKPHOS 97 2024    BILITOT 0.4 2024        Lab Results   Component Value Date    TSH 3.45 10/18/2023        REVIEW OF SYSTEM      Review of Systems    Patient has no other healthcare issues.  No fever no sweats no chills. No chest pain nor shortness of breath. No nausea nor vomiting no diarrhea . No  complaints.  No edema issues.    REVIEWED INFORMATION      Allergies   Allergen Reactions    Adhesive Tape Rash and Other (See Comments)     Blisters    Milk-Related Compounds     Septra

## 2024-05-29 RX ORDER — EZETIMIBE 10 MG/1
10 TABLET ORAL DAILY
Qty: 100 TABLET | Refills: 2 | Status: SHIPPED | OUTPATIENT
Start: 2024-05-29

## 2024-05-29 NOTE — TELEPHONE ENCOUNTER
Radha Carias is calling to request a refill on the following medication(s):    Medication Request:  Requested Prescriptions     Pending Prescriptions Disp Refills    ezetimibe (ZETIA) 10 MG tablet [Pharmacy Med Name: Ezetimibe 10 MG Oral Tablet] 100 tablet 2     Sig: TAKE 1 TABLET BY MOUTH ONCE  DAILY       Last Visit Date (If Applicable):  5/16/2024    Next Visit Date:    9/19/2024

## 2024-08-16 RX ORDER — LISINOPRIL 10 MG/1
10 TABLET ORAL DAILY
Qty: 100 TABLET | Refills: 2 | Status: SHIPPED | OUTPATIENT
Start: 2024-08-16

## 2024-08-16 NOTE — TELEPHONE ENCOUNTER
Radha Carias is calling to request a refill on the following medication(s):    Medication Request:  Requested Prescriptions     Pending Prescriptions Disp Refills    lisinopril (PRINIVIL;ZESTRIL) 10 MG tablet [Pharmacy Med Name: Lisinopril 10 MG Oral Tablet] 100 tablet 2     Sig: TAKE 1 TABLET BY MOUTH DAILY       Last Visit Date (If Applicable):  5/16/2024    Next Visit Date:    9/19/2024

## 2024-09-13 ENCOUNTER — HOSPITAL ENCOUNTER (OUTPATIENT)
Age: 79
Setting detail: SPECIMEN
Discharge: HOME | End: 2024-09-13

## 2024-09-13 DIAGNOSIS — I10 PRIMARY HYPERTENSION: ICD-10-CM

## 2024-09-13 DIAGNOSIS — N20.0 CALCULUS OF KIDNEY: ICD-10-CM

## 2024-09-13 LAB
ANION GAP SERPL CALCULATED.3IONS-SCNC: 9 MMOL/L (ref 9–16)
BUN SERPL-MCNC: 19 MG/DL (ref 8–23)
CALCIUM SERPL-MCNC: 9.3 MG/DL (ref 8.6–10.4)
CHLORIDE SERPL-SCNC: 106 MMOL/L (ref 98–107)
CO2 SERPL-SCNC: 26 MMOL/L (ref 20–31)
CREAT SERPL-MCNC: 0.7 MG/DL (ref 0.5–0.9)
GFR, ESTIMATED: 89 ML/MIN/1.73M2
GLUCOSE SERPL-MCNC: 94 MG/DL (ref 74–99)
POTASSIUM SERPL-SCNC: 4.2 MMOL/L (ref 3.7–5.3)
SODIUM SERPL-SCNC: 141 MMOL/L (ref 136–145)

## 2024-09-16 SDOH — HEALTH STABILITY: PHYSICAL HEALTH: ON AVERAGE, HOW MANY MINUTES DO YOU ENGAGE IN EXERCISE AT THIS LEVEL?: 60 MIN

## 2024-09-16 SDOH — HEALTH STABILITY: PHYSICAL HEALTH: ON AVERAGE, HOW MANY DAYS PER WEEK DO YOU ENGAGE IN MODERATE TO STRENUOUS EXERCISE (LIKE A BRISK WALK)?: 5 DAYS

## 2024-09-16 ASSESSMENT — PATIENT HEALTH QUESTIONNAIRE - PHQ9
SUM OF ALL RESPONSES TO PHQ QUESTIONS 1-9: 6
4. FEELING TIRED OR HAVING LITTLE ENERGY: NEARLY EVERY DAY
6. FEELING BAD ABOUT YOURSELF - OR THAT YOU ARE A FAILURE OR HAVE LET YOURSELF OR YOUR FAMILY DOWN: NOT AT ALL
3. TROUBLE FALLING OR STAYING ASLEEP: NEARLY EVERY DAY
8. MOVING OR SPEAKING SO SLOWLY THAT OTHER PEOPLE COULD HAVE NOTICED. OR THE OPPOSITE, BEING SO FIGETY OR RESTLESS THAT YOU HAVE BEEN MOVING AROUND A LOT MORE THAN USUAL: NOT AT ALL
9. THOUGHTS THAT YOU WOULD BE BETTER OFF DEAD, OR OF HURTING YOURSELF: NOT AT ALL
SUM OF ALL RESPONSES TO PHQ QUESTIONS 1-9: 6
SUM OF ALL RESPONSES TO PHQ QUESTIONS 1-9: 6
10. IF YOU CHECKED OFF ANY PROBLEMS, HOW DIFFICULT HAVE THESE PROBLEMS MADE IT FOR YOU TO DO YOUR WORK, TAKE CARE OF THINGS AT HOME, OR GET ALONG WITH OTHER PEOPLE: NOT DIFFICULT AT ALL
2. FEELING DOWN, DEPRESSED OR HOPELESS: NOT AT ALL
7. TROUBLE CONCENTRATING ON THINGS, SUCH AS READING THE NEWSPAPER OR WATCHING TELEVISION: NOT AT ALL
SUM OF ALL RESPONSES TO PHQ9 QUESTIONS 1 & 2: 0
1. LITTLE INTEREST OR PLEASURE IN DOING THINGS: NOT AT ALL
SUM OF ALL RESPONSES TO PHQ QUESTIONS 1-9: 6
5. POOR APPETITE OR OVEREATING: NOT AT ALL

## 2024-09-16 ASSESSMENT — LIFESTYLE VARIABLES
HOW OFTEN DO YOU HAVE A DRINK CONTAINING ALCOHOL: 98
HOW MANY STANDARD DRINKS CONTAINING ALCOHOL DO YOU HAVE ON A TYPICAL DAY: PATIENT DECLINED
HOW OFTEN DO YOU HAVE A DRINK CONTAINING ALCOHOL: PATIENT DECLINED
HOW MANY STANDARD DRINKS CONTAINING ALCOHOL DO YOU HAVE ON A TYPICAL DAY: 98
HOW OFTEN DO YOU HAVE SIX OR MORE DRINKS ON ONE OCCASION: 1

## 2024-09-19 ENCOUNTER — OFFICE VISIT (OUTPATIENT)
Age: 79
End: 2024-09-19

## 2024-09-19 VITALS
SYSTOLIC BLOOD PRESSURE: 132 MMHG | HEIGHT: 62 IN | HEART RATE: 65 BPM | BODY MASS INDEX: 30.59 KG/M2 | TEMPERATURE: 97.1 F | WEIGHT: 166.2 LBS | OXYGEN SATURATION: 95 % | DIASTOLIC BLOOD PRESSURE: 74 MMHG

## 2024-09-19 DIAGNOSIS — Z23 ENCOUNTER FOR IMMUNIZATION: ICD-10-CM

## 2024-09-19 DIAGNOSIS — Z12.31 ENCOUNTER FOR SCREENING MAMMOGRAM FOR MALIGNANT NEOPLASM OF BREAST: ICD-10-CM

## 2024-09-19 DIAGNOSIS — Z00.00 MEDICARE ANNUAL WELLNESS VISIT, SUBSEQUENT: Primary | ICD-10-CM

## 2024-09-19 RX ORDER — FLUTICASONE FUROATE AND VILANTEROL TRIFENATATE 200; 25 UG/1; UG/1
1 POWDER RESPIRATORY (INHALATION) DAILY
COMMUNITY
Start: 2024-09-17

## 2024-12-03 ENCOUNTER — HOSPITAL ENCOUNTER (OUTPATIENT)
Age: 79
Setting detail: SPECIMEN
Discharge: HOME | End: 2024-12-03

## 2024-12-03 LAB
IGA SERPL-MCNC: 322 MG/DL (ref 70–400)
IGE SERPL-ACNC: 14 IU/ML (ref 0–100)
IGG SERPL-MCNC: 976 MG/DL (ref 700–1600)
IGM SERPL-MCNC: 100 MG/DL (ref 40–230)

## 2024-12-20 ENCOUNTER — HOSPITAL ENCOUNTER (OUTPATIENT)
Dept: MAMMOGRAPHY | Age: 79
Discharge: HOME | End: 2024-12-22
Attending: FAMILY MEDICINE
Payer: MEDICARE

## 2024-12-20 DIAGNOSIS — Z12.31 ENCOUNTER FOR SCREENING MAMMOGRAM FOR MALIGNANT NEOPLASM OF BREAST: ICD-10-CM

## 2024-12-20 PROCEDURE — 77063 BREAST TOMOSYNTHESIS BI: CPT

## 2025-01-20 RX ORDER — MELOXICAM 15 MG/1
15 TABLET ORAL DAILY
Qty: 100 TABLET | Refills: 2 | Status: SHIPPED | OUTPATIENT
Start: 2025-01-20

## 2025-01-20 NOTE — TELEPHONE ENCOUNTER
Radha Carias is calling to request a refill on the following medication(s):    Medication Request:  Requested Prescriptions     Pending Prescriptions Disp Refills    meloxicam (MOBIC) 15 MG tablet [Pharmacy Med Name: Meloxicam 15 MG Oral Tablet] 100 tablet 2     Sig: TAKE 1 TABLET BY MOUTH DAILY       Last Visit Date (If Applicable):  9/19/2024    Next Visit Date:    1/21/2025

## 2025-01-21 ENCOUNTER — TELEPHONE (OUTPATIENT)
Age: 80
End: 2025-01-21

## 2025-01-21 ENCOUNTER — OFFICE VISIT (OUTPATIENT)
Age: 80
End: 2025-01-21

## 2025-01-21 VITALS
WEIGHT: 167 LBS | DIASTOLIC BLOOD PRESSURE: 66 MMHG | RESPIRATION RATE: 12 BRPM | BODY MASS INDEX: 30.73 KG/M2 | OXYGEN SATURATION: 95 % | HEIGHT: 62 IN | SYSTOLIC BLOOD PRESSURE: 134 MMHG | HEART RATE: 66 BPM

## 2025-01-21 DIAGNOSIS — R06.09 DYSPNEA ON EXERTION: ICD-10-CM

## 2025-01-21 DIAGNOSIS — E78.2 MIXED HYPERLIPIDEMIA: ICD-10-CM

## 2025-01-21 DIAGNOSIS — I10 PRIMARY HYPERTENSION: Primary | ICD-10-CM

## 2025-01-21 DIAGNOSIS — R06.02 SHORTNESS OF BREATH: ICD-10-CM

## 2025-01-21 DIAGNOSIS — R53.83 OTHER FATIGUE: ICD-10-CM

## 2025-01-21 DIAGNOSIS — J45.40 MODERATE PERSISTENT ASTHMA, UNSPECIFIED WHETHER COMPLICATED: ICD-10-CM

## 2025-01-21 RX ORDER — FLUTICASONE FUROATE, UMECLIDINIUM BROMIDE AND VILANTEROL TRIFENATATE 200; 62.5; 25 UG/1; UG/1; UG/1
1 POWDER RESPIRATORY (INHALATION) DAILY
COMMUNITY

## 2025-01-21 SDOH — ECONOMIC STABILITY: FOOD INSECURITY: WITHIN THE PAST 12 MONTHS, THE FOOD YOU BOUGHT JUST DIDN'T LAST AND YOU DIDN'T HAVE MONEY TO GET MORE.: NEVER TRUE

## 2025-01-21 SDOH — ECONOMIC STABILITY: FOOD INSECURITY: WITHIN THE PAST 12 MONTHS, YOU WORRIED THAT YOUR FOOD WOULD RUN OUT BEFORE YOU GOT MONEY TO BUY MORE.: NEVER TRUE

## 2025-01-21 ASSESSMENT — PATIENT HEALTH QUESTIONNAIRE - PHQ9
1. LITTLE INTEREST OR PLEASURE IN DOING THINGS: NOT AT ALL
SUM OF ALL RESPONSES TO PHQ QUESTIONS 1-9: 0
8. MOVING OR SPEAKING SO SLOWLY THAT OTHER PEOPLE COULD HAVE NOTICED. OR THE OPPOSITE, BEING SO FIGETY OR RESTLESS THAT YOU HAVE BEEN MOVING AROUND A LOT MORE THAN USUAL: NOT AT ALL
SUM OF ALL RESPONSES TO PHQ QUESTIONS 1-9: 0
SUM OF ALL RESPONSES TO PHQ9 QUESTIONS 1 & 2: 0
SUM OF ALL RESPONSES TO PHQ QUESTIONS 1-9: 0
SUM OF ALL RESPONSES TO PHQ QUESTIONS 1-9: 0
10. IF YOU CHECKED OFF ANY PROBLEMS, HOW DIFFICULT HAVE THESE PROBLEMS MADE IT FOR YOU TO DO YOUR WORK, TAKE CARE OF THINGS AT HOME, OR GET ALONG WITH OTHER PEOPLE: NOT DIFFICULT AT ALL
4. FEELING TIRED OR HAVING LITTLE ENERGY: NOT AT ALL
3. TROUBLE FALLING OR STAYING ASLEEP: NOT AT ALL
5. POOR APPETITE OR OVEREATING: NOT AT ALL
2. FEELING DOWN, DEPRESSED OR HOPELESS: NOT AT ALL
7. TROUBLE CONCENTRATING ON THINGS, SUCH AS READING THE NEWSPAPER OR WATCHING TELEVISION: NOT AT ALL
9. THOUGHTS THAT YOU WOULD BE BETTER OFF DEAD, OR OF HURTING YOURSELF: NOT AT ALL
6. FEELING BAD ABOUT YOURSELF - OR THAT YOU ARE A FAILURE OR HAVE LET YOURSELF OR YOUR FAMILY DOWN: NOT AT ALL

## 2025-01-21 NOTE — PROGRESS NOTES
[facility]     Date of Visit:  2025  Patient Name: Radha Carias   Patient :  1945     CHIEF COMPLAINT/HPI:     Radha Carias is a 79 y.o. female who presents today for an general visit to be evaluated for the following condition(s):  Chief Complaint   Patient presents with    Discuss Labs    Discuss Medications     Patient's labs from September were reviewed.  She has been to new pulmonologist and apparently she has fairly significant obstruction on exhalation.  He suggested that she may want to see cardiologist.  We will order cardiac echo and plain chest x-ray  Lab Results   Component Value Date/Time     2024 11:20 AM    K 4.2 2024 11:20 AM     2024 11:20 AM    CO2 26 2024 11:20 AM    BUN 19 2024 11:20 AM    CREATININE 0.7 2024 11:20 AM    GLUCOSE 94 2024 11:20 AM    CALCIUM 9.3 2024 11:20 AM    LABGLOM 89 2024 11:20 AM    LABGLOM 58 2024 06:14 AM            Lab Results   Component Value Date    CHOL 221 (H) 2024    TRIG 86 2024    HDL 64 2024    VLDL 17 2024    CHOLHDLRATIO 3.0 2024        Lab Results   Component Value Date    WBC 4.4 2024    HGB 13.3 2024    HCT 41.4 2024    MCV 93.9 2024     2024       Lab Results   Component Value Date    ALT 24 2024    AST 28 2024    ALKPHOS 97 2024    BILITOT 0.4 2024        Lab Results   Component Value Date    TSH 3.45 10/18/2023        REVIEW OF SYSTEM      Review of Systems  No fever no sweats no chills no nausea no vomiting she does have tightness with exhalation in the upper airway not much in the way of wheezing.  No orthopnea    REVIEWED INFORMATION      Allergies   Allergen Reactions    Adhesive Tape Rash and Other (See Comments)     Blisters    Milk-Related Compounds     Septra [Sulfamethoxazole-Trimethoprim]     Sulfa Antibiotics Headaches     MIGRAINE      Egg-Derived

## 2025-01-21 NOTE — TELEPHONE ENCOUNTER
Please call Dr. Conde pulmonology at [facility] and get copy of patient's last office visit

## 2025-02-04 ENCOUNTER — HOSPITAL ENCOUNTER (OUTPATIENT)
Age: 80
Discharge: HOME | End: 2025-02-06
Attending: FAMILY MEDICINE
Payer: MEDICARE

## 2025-02-04 VITALS — HEIGHT: 62 IN | BODY MASS INDEX: 30.73 KG/M2 | WEIGHT: 167 LBS

## 2025-02-04 DIAGNOSIS — R06.02 SHORTNESS OF BREATH: ICD-10-CM

## 2025-02-04 LAB
ECHO AO ROOT DIAM: 2.9 CM
ECHO AO ROOT INDEX: 1.64 CM/M2
ECHO AV AREA PEAK VELOCITY: 2.4 CM2
ECHO AV AREA VTI: 2.3 CM2
ECHO AV AREA/BSA PEAK VELOCITY: 1.4 CM2/M2
ECHO AV AREA/BSA VTI: 1.3 CM2/M2
ECHO AV MEAN GRADIENT: 5 MMHG
ECHO AV MEAN VELOCITY: 1.1 M/S
ECHO AV PEAK GRADIENT: 9 MMHG
ECHO AV PEAK VELOCITY: 1.5 M/S
ECHO AV VELOCITY RATIO: 0.73
ECHO AV VTI: 39.4 CM
ECHO BSA: 1.82 M2
ECHO EST RA PRESSURE: 3 MMHG
ECHO IVC EXP: 1.8 CM
ECHO IVC INSP: 0.8 CM
ECHO LA AREA 2C: 18.5 CM2
ECHO LA AREA 4C: 16.1 CM2
ECHO LA DIAMETER INDEX: 2.26 CM/M2
ECHO LA DIAMETER: 4 CM
ECHO LA MAJOR AXIS: 4.9 CM
ECHO LA MINOR AXIS: 5.1 CM
ECHO LA TO AORTIC ROOT RATIO: 1.38
ECHO LA VOL BP: 49 ML (ref 22–52)
ECHO LA VOL MOD A2C: 55 ML (ref 22–52)
ECHO LA VOL MOD A4C: 42 ML (ref 22–52)
ECHO LA VOL/BSA BIPLANE: 28 ML/M2 (ref 16–34)
ECHO LA VOLUME INDEX MOD A2C: 31 ML/M2 (ref 16–34)
ECHO LA VOLUME INDEX MOD A4C: 24 ML/M2 (ref 16–34)
ECHO LV E' LATERAL VELOCITY: 12.4 CM/S
ECHO LV E' SEPTAL VELOCITY: 7.07 CM/S
ECHO LV EF PHYSICIAN: 65 %
ECHO LV FRACTIONAL SHORTENING: 37 % (ref 28–44)
ECHO LV INTERNAL DIMENSION DIASTOLE INDEX: 2.15 CM/M2
ECHO LV INTERNAL DIMENSION DIASTOLIC: 3.8 CM (ref 3.9–5.3)
ECHO LV INTERNAL DIMENSION SYSTOLIC INDEX: 1.36 CM/M2
ECHO LV INTERNAL DIMENSION SYSTOLIC: 2.4 CM
ECHO LV IVSD: 0.9 CM (ref 0.6–0.9)
ECHO LV MASS 2D: 101.1 G (ref 67–162)
ECHO LV MASS INDEX 2D: 57.1 G/M2 (ref 43–95)
ECHO LV POSTERIOR WALL DIASTOLIC: 0.9 CM (ref 0.6–0.9)
ECHO LV RELATIVE WALL THICKNESS RATIO: 0.47
ECHO LVOT AREA: 3.1 CM2
ECHO LVOT AV VTI INDEX: 0.74
ECHO LVOT DIAM: 2 CM
ECHO LVOT MEAN GRADIENT: 3 MMHG
ECHO LVOT PEAK GRADIENT: 5 MMHG
ECHO LVOT PEAK VELOCITY: 1.1 M/S
ECHO LVOT STROKE VOLUME INDEX: 51.8 ML/M2
ECHO LVOT SV: 91.7 ML
ECHO LVOT VTI: 29.2 CM
ECHO MV A VELOCITY: 0.7 M/S
ECHO MV E DECELERATION TIME (DT): 180 MS
ECHO MV E VELOCITY: 0.96 M/S
ECHO MV E/A RATIO: 1.37
ECHO MV E/E' LATERAL: 7.74
ECHO MV E/E' RATIO (AVERAGED): 10.66
ECHO MV E/E' SEPTAL: 13.58
ECHO RIGHT VENTRICULAR SYSTOLIC PRESSURE (RVSP): 29 MMHG
ECHO RV BASAL DIMENSION: 3.4 CM
ECHO RV FREE WALL PEAK S': 13.5 CM/S
ECHO TV REGURGITANT MAX VELOCITY: 2.54 M/S
ECHO TV REGURGITANT PEAK GRADIENT: 26 MMHG

## 2025-02-04 PROCEDURE — 93306 TTE W/DOPPLER COMPLETE: CPT

## 2025-02-04 PROCEDURE — 93306 TTE W/DOPPLER COMPLETE: CPT | Performed by: INTERNAL MEDICINE

## 2025-04-14 RX ORDER — EZETIMIBE 10 MG/1
10 TABLET ORAL DAILY
Qty: 100 TABLET | Refills: 2 | Status: SHIPPED | OUTPATIENT
Start: 2025-04-14

## 2025-04-14 NOTE — TELEPHONE ENCOUNTER
Radha Carias is calling to request a refill on the following medication(s):    Medication Request:  Requested Prescriptions     Pending Prescriptions Disp Refills    ezetimibe (ZETIA) 10 MG tablet [Pharmacy Med Name: Ezetimibe 10 MG Oral Tablet] 100 tablet 2     Sig: TAKE 1 TABLET BY MOUTH ONCE  DAILY       Last Visit Date (If Applicable):  1/21/2025    Next Visit Date:    6/2/2025

## 2025-05-28 ENCOUNTER — RESULTS FOLLOW-UP (OUTPATIENT)
Age: 80
End: 2025-05-28

## 2025-05-28 ENCOUNTER — HOSPITAL ENCOUNTER (OUTPATIENT)
Age: 80
Setting detail: SPECIMEN
Discharge: HOME | End: 2025-05-28

## 2025-05-28 DIAGNOSIS — R53.83 OTHER FATIGUE: ICD-10-CM

## 2025-05-28 DIAGNOSIS — J45.40 MODERATE PERSISTENT ASTHMA, UNSPECIFIED WHETHER COMPLICATED: ICD-10-CM

## 2025-05-28 DIAGNOSIS — R06.02 SHORTNESS OF BREATH: ICD-10-CM

## 2025-05-28 DIAGNOSIS — E78.2 MIXED HYPERLIPIDEMIA: ICD-10-CM

## 2025-05-28 DIAGNOSIS — I10 PRIMARY HYPERTENSION: ICD-10-CM

## 2025-05-28 LAB
ALBUMIN SERPL-MCNC: 4.2 G/DL (ref 3.5–5.2)
ALBUMIN/GLOB SERPL: 1.6 {RATIO} (ref 1–2.5)
ALP SERPL-CCNC: 87 U/L (ref 35–104)
ALT SERPL-CCNC: 24 U/L (ref 10–35)
ANION GAP SERPL CALCULATED.3IONS-SCNC: 10 MMOL/L (ref 9–16)
AST SERPL-CCNC: 24 U/L (ref 10–35)
BILIRUB DIRECT SERPL-MCNC: 0.2 MG/DL (ref 0–0.2)
BILIRUB INDIRECT SERPL-MCNC: 0.2 MG/DL (ref 0–1)
BILIRUB SERPL-MCNC: 0.4 MG/DL (ref 0–1.2)
BUN SERPL-MCNC: 20 MG/DL (ref 8–23)
CALCIUM SERPL-MCNC: 9.6 MG/DL (ref 8.6–10.4)
CHLORIDE SERPL-SCNC: 105 MMOL/L (ref 98–107)
CHOLEST SERPL-MCNC: 225 MG/DL (ref 0–199)
CHOLESTEROL/HDL RATIO: 3.3
CO2 SERPL-SCNC: 26 MMOL/L (ref 20–31)
CREAT SERPL-MCNC: 0.8 MG/DL (ref 0.6–0.9)
ERYTHROCYTE [DISTWIDTH] IN BLOOD BY AUTOMATED COUNT: 12.1 % (ref 11.8–14.4)
GFR, ESTIMATED: 76 ML/MIN/1.73M2
GLUCOSE SERPL-MCNC: 88 MG/DL (ref 74–99)
HCT VFR BLD AUTO: 41 % (ref 36.3–47.1)
HDLC SERPL-MCNC: 68 MG/DL
HGB BLD-MCNC: 13.2 G/DL (ref 11.9–15.1)
LDLC SERPL CALC-MCNC: 142 MG/DL (ref 0–100)
MCH RBC QN AUTO: 30.4 PG (ref 25.2–33.5)
MCHC RBC AUTO-ENTMCNC: 32.2 G/DL (ref 28.4–34.8)
MCV RBC AUTO: 94.5 FL (ref 82.6–102.9)
NRBC BLD-RTO: 0 PER 100 WBC
PLATELET # BLD AUTO: 258 K/UL (ref 138–453)
PMV BLD AUTO: 10.5 FL (ref 8.1–13.5)
POTASSIUM SERPL-SCNC: 4.3 MMOL/L (ref 3.7–5.3)
PROT SERPL-MCNC: 6.9 G/DL (ref 6.6–8.7)
RBC # BLD AUTO: 4.34 M/UL (ref 3.95–5.11)
SODIUM SERPL-SCNC: 141 MMOL/L (ref 136–145)
T4 FREE SERPL-MCNC: 1.1 NG/DL (ref 0.92–1.68)
TRIGL SERPL-MCNC: 77 MG/DL
TSH SERPL DL<=0.05 MIU/L-ACNC: 3.69 UIU/ML (ref 0.27–4.2)
VLDLC SERPL CALC-MCNC: 15 MG/DL (ref 1–30)
WBC OTHER # BLD: 4.5 K/UL (ref 3.5–11.3)

## 2025-06-02 ENCOUNTER — OFFICE VISIT (OUTPATIENT)
Age: 80
End: 2025-06-02
Payer: MEDICARE

## 2025-06-02 VITALS
HEART RATE: 80 BPM | OXYGEN SATURATION: 96 % | SYSTOLIC BLOOD PRESSURE: 138 MMHG | TEMPERATURE: 98.2 F | WEIGHT: 165.6 LBS | BODY MASS INDEX: 30.29 KG/M2 | DIASTOLIC BLOOD PRESSURE: 62 MMHG

## 2025-06-02 DIAGNOSIS — R53.83 OTHER FATIGUE: ICD-10-CM

## 2025-06-02 DIAGNOSIS — M25.562 ACUTE PAIN OF LEFT KNEE: Primary | ICD-10-CM

## 2025-06-02 DIAGNOSIS — E78.2 MIXED HYPERLIPIDEMIA: ICD-10-CM

## 2025-06-02 DIAGNOSIS — M70.71 ISCHIAL BURSITIS OF RIGHT SIDE: ICD-10-CM

## 2025-06-02 DIAGNOSIS — I10 PRIMARY HYPERTENSION: ICD-10-CM

## 2025-06-02 PROCEDURE — 1159F MED LIST DOCD IN RCRD: CPT | Performed by: FAMILY MEDICINE

## 2025-06-02 PROCEDURE — 1123F ACP DISCUSS/DSCN MKR DOCD: CPT | Performed by: FAMILY MEDICINE

## 2025-06-02 PROCEDURE — 3078F DIAST BP <80 MM HG: CPT | Performed by: FAMILY MEDICINE

## 2025-06-02 PROCEDURE — 99214 OFFICE O/P EST MOD 30 MIN: CPT | Performed by: FAMILY MEDICINE

## 2025-06-02 PROCEDURE — 3075F SYST BP GE 130 - 139MM HG: CPT | Performed by: FAMILY MEDICINE

## 2025-06-02 PROCEDURE — 96372 THER/PROPH/DIAG INJ SC/IM: CPT | Performed by: FAMILY MEDICINE

## 2025-06-02 RX ORDER — TRIAMCINOLONE ACETONIDE 40 MG/ML
40 INJECTION, SUSPENSION INTRA-ARTICULAR; INTRAMUSCULAR ONCE
Status: COMPLETED | OUTPATIENT
Start: 2025-06-02 | End: 2025-06-02

## 2025-06-02 RX ORDER — CLOTRIMAZOLE 10 MG/1
LOZENGE ORAL PRN
COMMUNITY
Start: 2025-03-05

## 2025-06-02 RX ADMIN — TRIAMCINOLONE ACETONIDE 40 MG: 40 INJECTION, SUSPENSION INTRA-ARTICULAR; INTRAMUSCULAR at 13:36

## 2025-06-02 NOTE — PROGRESS NOTES
After obtaining consent, and per orders of Dr. Ledezma, injection of Kenalog 40MG given in right ventrogluteal by Anna Parisi CMA. ABN was signed prior to injection, copy was given to the patient. Patient tolerated the injection well.

## 2025-06-02 NOTE — PATIENT INSTRUCTIONS
Radha    Thank you for choosing [facility].  We know you have options when it comes to your healthcare; we appreciate that you chose us. Our goal is to provide exceptional  service and world class care to every patient.  You will be receiving a survey via email or text message asking for your feedback.  Please take a few minutes to share your thoughts about your recent visit. Your comments help us understand what we do well and ways we can improve.  Thank you in advance for your valuable feedback.      Dr. Darien Parisi, CMA

## 2025-06-02 NOTE — PROGRESS NOTES
[facility]     Date of Visit:  2025  Patient Name: Radha Carias   Patient :  1945     CHIEF COMPLAINT/HPI:     Radha Carias is a 79 y.o. female who presents today for an general visit to be evaluated for the following condition(s):  Chief Complaint   Patient presents with    3 Month Follow-Up     Discuss left knee and right hip pain.    Echocardiogram was normal.  Patient did not get chest x-ray her respiratory functions resolved after she was placed back on Breo.  Her main issue now is that of right ischial bursitis tendinitis and strain pain of left knee.  Both of these issues seem to be worse since she has been doing a lot of gardening  Interpretation Summary  Show Result Comparison     Left Ventricle: Normal left ventricular systolic function. EF by visual approximation is 65%. Left ventricle size is normal. Normal wall thickness. Normal wall motion. Normal diastolic function.    Aortic Valve: Trileaflet valve. Mild sclerosis of the aortic valve cusps.    Tricuspid Valve: Normal RVSP. The estimated RVSP is 29 mmHg.    Image quality is fair.       Recent labs were unremarkable    Lab Results   Component Value Date/Time     2025 08:52 AM    K 4.3 2025 08:52 AM     2025 08:52 AM    CO2 26 2025 08:52 AM    BUN 20 2025 08:52 AM    CREATININE 0.8 2025 08:52 AM    GLUCOSE 88 2025 08:52 AM    CALCIUM 9.6 2025 08:52 AM    LABGLOM 76 2025 08:52 AM    LABGLOM 58 2024 06:14 AM           Lab Results   Component Value Date    CHOL 225 (H) 2025    TRIG 77 2025    HDL 68 2025    VLDL 15 2025    CHOLHDLRATIO 3.3 2025        Lab Results   Component Value Date    WBC 4.5 2025    HGB 13.2 2025    HCT 41.0 2025    MCV 94.5 2025     2025       Lab Results   Component Value Date    ALT 24 2025    AST 24 2025    ALKPHOS 87 2025    BILITOT 0.4

## 2025-06-05 ENCOUNTER — RESULTS FOLLOW-UP (OUTPATIENT)
Age: 80
End: 2025-06-05

## 2025-06-10 ENCOUNTER — HOSPITAL ENCOUNTER (OUTPATIENT)
Age: 80
Setting detail: THERAPIES SERIES
Discharge: HOME | End: 2025-06-10
Attending: FAMILY MEDICINE
Payer: MEDICARE

## 2025-06-10 PROCEDURE — 97162 PT EVAL MOD COMPLEX 30 MIN: CPT

## 2025-06-10 NOTE — CONSULTS
She reports hip pain is worst while riding  and sitting for prolonged periods.  Pt c/o pain in anterior left knee along patella and just inferior to patella  CC:   C/o pain in right post hip and left ant knee.  She reports knee is worst with sit to stand and walking.    HPI: (onset date)  6/2/25    PMHx: [] Unremarkable [] Diabetes [x] HTN  [] Pacemaker   [] MI/Heart Problems [] Cancer [x] Arthritis [] Other:              [x] Refer to full medical chart  In EPIC     Past Medical History:   Diagnosis Date    Allergic rhinitis     Asthma     GERD (gastroesophageal reflux disease)     Hyperlipidemia     Hypertension     Kidney stones     Osteoarthritis     PONV (postoperative nausea and vomiting)     Severe N/V    Spinal cord cysts     Under care of team 01/25/2024    PCP: Dr. Cameron Ledezma [place], last visit 12/2023    Under care of team 01/25/2024    Pulmonary: Lori Larson, last visit 3/2023     Past Surgical History:   Procedure Laterality Date    ABDOMEN SURGERY  1975    ADENOIDECTOMY      APPENDECTOMY  1975    BACK SURGERY      c6-7 disectomy    CARPAL TUNNEL RELEASE Right     CATARACT EXTRACTION, BILATERAL      CERVICAL LAMINECTOMY      COLONOSCOPY      CYSTOSCOPY  01/16/2024    flexible    CYSTOSCOPY  01/31/2024    CYSTOSCOPY INSERTION OCCLUSIVE BALLOON + nephrolithotomy    CYSTOSCOPY Right 1/31/2024    CYSTOSCOPY INSERTION OCCLUSIVE BALLOON  (PT. GOING TO INTERVENTIONAL RAD) performed by Rina Ludwig MD at [facility] OR    DILATION AND CURETTAGE OF UTERUS      IR GUIDED NEPHROSTOMY CATH PLACEMENT RIGHT  1/31/2024    IR NEPHROSTOMY PERCUTANEOUS RIGHT 1/31/2024 Ture Rose MD [facility] SPECIAL PROCEDURES    KIDNEY STONE REMOVAL Right 1/31/2024    HOLMIUM, PERCUTANEOUS NEPHROLITHOTOMY, LITHOCLAST, C-ARM  (PT. COMING FROM INTERVENTIONAL RAD) performed by Rian Ludwig MD at [facility] OR    KIDNEY STONE SURGERY Right 01/31/2024    Holium, Percutaneous Nephrolithotomy, Lithoclast    KNEE SURGERY

## 2025-06-13 ENCOUNTER — HOSPITAL ENCOUNTER (OUTPATIENT)
Age: 80
Setting detail: THERAPIES SERIES
Discharge: HOME | End: 2025-06-13
Attending: FAMILY MEDICINE
Payer: MEDICARE

## 2025-06-13 PROCEDURE — 97110 THERAPEUTIC EXERCISES: CPT

## 2025-06-13 NOTE — FLOWSHEET NOTE
[x] [facility]  Outpatient Rehabilitation & Therapy  [address], [place] [address]    Physical Therapy Daily Treatment Note      Date:  2025  Patient Name:  Radha Carias    :  1945  MRN: [id number]  Physician:  Cameron Ledezma MD                                    Insurance: [facility] MEDICARE;  No Hard Max; AUTH needed after eval thru [employer]  Medical Diagnosis:   M25.562 (ICD-10-CM) - Acute pain of left knee   M70.71 (ICD-10-CM) - Ischial bursitis of right side                             Rehab Codes: M25.551; M25.661, M25.662,   Onset Date: 25                                  Next 's appt: 25  Visit# / total visits:   Cancels/No Shows: 0/0  PN due at visit 10 per medicare guidelines  Subjective:    Pain:  [x] Yes  [] No Location: left knee/ right buttock  Pain Rating: (0-10 scale) 3-4/10  Pain altered Tx:  [x] No  [] Yes  Action:  Comments: Patient does note she seems to feel a little better since beginning stretches.     Objective:  Modalities:   Precautions: none  Exercises:  MAT TABLE         Janett Piriformis Stretch 15\" x 4       Janett Modified Jacob Stretch 30\" x 2       Supine Ham stretch 15\" x 4       4 way SLR  x5-10 janett    added 6/13   SLR with toe out  x10 janett    added 6/13   Janett Hip Add Isos  5\" x10    added 6/13   Janett hip Abd  x10  lime  added 6/13                                           SEATED         Towel Calf Stretch 15\" x 4                           STAND         Ham Stretch At Steps  15\" x4    added 6/13   Janett Calf Stretch                                                                                                -                                                      TENS/US?                             Other:    Specific Instructions for next treatment:Review response to previous visit and HEP; Add exercises per above list to improve bilateral LE strength and ROM; Passive modalities as needed. Check for approval before scheduling

## 2025-06-20 ENCOUNTER — HOSPITAL ENCOUNTER (OUTPATIENT)
Age: 80
Setting detail: THERAPIES SERIES
Discharge: HOME | End: 2025-06-20
Attending: FAMILY MEDICINE
Payer: MEDICARE

## 2025-06-20 PROCEDURE — 97110 THERAPEUTIC EXERCISES: CPT

## 2025-06-20 NOTE — FLOWSHEET NOTE
[x] [facility]  Outpatient Rehabilitation & Therapy  [address] [place], [place] [address]    Physical Therapy Daily Treatment Note      Date:  2025  Patient Name:  Radha Carias    :  1945  MRN: [id number]  Physician:  Cameron Ledezma MD                                    Insurance: [facility] MEDICARE;  No Hard Max; AUTH needed after eval thru [employer] 16 VISITS (6/10/25-25)   Medical Diagnosis:   M25.562 (ICD-10-CM) - Acute pain of left knee   M70.71 (ICD-10-CM) - Ischial bursitis of right side                             Rehab Codes: M25.551; M25.661, M25.662,   Onset Date: 25                                  Next 's appt: 25  Visit# / total visits: 3/16  Cancels/No Shows: 0/0  PN due at visit 10 per medicare guidelines  Subjective:    Pain:  [x] Yes  [] No Location: left knee/ right buttock  Pain Rating: (0-10 scale) 3-5/10  Pain altered Tx:  [x] No  [] Yes  Action:  Comments: Just finished 2 hours of mowing- feeling more painful. States she hasn't been very faithful with HEP as she is getting her house/ yard ready for 2 parties. She states she would like to hold of on coming to PT appts until after the holiday (25) as her time will be much more flexible. She did note she will try to be better at doing HEP until then    Objective:  Modalities:   Precautions: none  Exercises:  MAT TABLE         Janett Piriformis Stretch 15\" x 4       Janett Modified Jacob Stretch 30\" x 2       Supine Ham stretch 15\" x 4       4 way SLR  10 janett    added /   SLR with toe out  x10 janett    added /   Janett Hip Add Isos  5\" x10    added /   Janett hip Abd  x10  lime  added /                                           SEATED         Towel Calf Stretch 15\" x 4        seated hamstring stretch with right foot on chair  instructed    added 6/20             STAND         Ham Stretch At Steps  15\" x4    added /   Janett Calf Stretch                              roller to right buttock/

## 2025-06-23 ENCOUNTER — TELEPHONE (OUTPATIENT)
Age: 80
End: 2025-06-23

## 2025-06-23 DIAGNOSIS — E55.9 VITAMIN D DEFICIENCY: ICD-10-CM

## 2025-06-23 DIAGNOSIS — N20.0 KIDNEY STONES: ICD-10-CM

## 2025-06-23 DIAGNOSIS — R53.83 OTHER FATIGUE: ICD-10-CM

## 2025-06-23 DIAGNOSIS — N20.0 CALCULUS OF KIDNEY: ICD-10-CM

## 2025-06-23 DIAGNOSIS — J45.40 MODERATE PERSISTENT ASTHMA, UNSPECIFIED WHETHER COMPLICATED: ICD-10-CM

## 2025-06-23 DIAGNOSIS — E78.2 MIXED HYPERLIPIDEMIA: ICD-10-CM

## 2025-06-23 DIAGNOSIS — I10 PRIMARY HYPERTENSION: Primary | ICD-10-CM

## 2025-06-23 NOTE — TELEPHONE ENCOUNTER
Does patient  need labs for her Sept appt she goes to a [facility] lab if she does  just notify pt

## 2025-07-07 ENCOUNTER — HOSPITAL ENCOUNTER (OUTPATIENT)
Age: 80
Setting detail: THERAPIES SERIES
Discharge: HOME | End: 2025-07-07
Attending: FAMILY MEDICINE
Payer: MEDICARE

## 2025-07-07 RX ORDER — LISINOPRIL 10 MG/1
10 TABLET ORAL DAILY
Qty: 100 TABLET | Refills: 2 | Status: SHIPPED | OUTPATIENT
Start: 2025-07-07

## 2025-07-07 NOTE — TELEPHONE ENCOUNTER
Radha Carias is calling to request a refill on the following medication(s):    Medication Request:  Requested Prescriptions     Pending Prescriptions Disp Refills    lisinopril (PRINIVIL;ZESTRIL) 10 MG tablet [Pharmacy Med Name: Lisinopril 10 MG Oral Tablet] 100 tablet 2     Sig: TAKE 1 TABLET BY MOUTH DAILY       Last Visit Date (If Applicable):  6/2/2025    Next Visit Date:    9/23/2025

## 2025-07-07 NOTE — FLOWSHEET NOTE
[x] [facility]  Outpatient Rehabilitation & Therapy  [address]   [place], [place] [address]       Physical Therapy Cancel/No Show note    Date: 2025  Patient: Radha Carias  : 1945  MRN: [id number]      Cancels/No Shows to date:     For today's appointment patient:    []  Cancelled    [x] Rescheduled appointment    [] No-show     Reason given by patient:    []  Patient ill    []  Conflicting appointment    [] No transportation      [] Conflict with work    [] No reason given    [] Weather related    [] COVID-19    [] Other:      Comments:        [x] Next appointment was confirmed    Electronically signed by: Magda Crouch PTA

## 2025-07-09 ENCOUNTER — HOSPITAL ENCOUNTER (OUTPATIENT)
Age: 80
Setting detail: THERAPIES SERIES
Discharge: HOME | End: 2025-07-09
Attending: FAMILY MEDICINE
Payer: MEDICARE

## 2025-07-09 PROCEDURE — 97110 THERAPEUTIC EXERCISES: CPT

## 2025-07-09 NOTE — FLOWSHEET NOTE
[x] [facility]  Outpatient Rehabilitation & Therapy  [address] [place], [place] [address]    Physical Therapy Daily Treatment Note      Date:  2025  Patient Name:  Radha Carias    :  1945  MRN: [id number]  Physician:  Cameron Ledezma MD                                    Insurance: [facility] MEDICARE;  No Hard Max; AUTH needed after eval thru [employer] 16 VISITS (6/10/25-25)   Medical Diagnosis:   M25.562 (ICD-10-CM) - Acute pain of left knee   M70.71 (ICD-10-CM) - Ischial bursitis of right side                             Rehab Codes: M25.551; M25.661, M25.662,   Onset Date: 25                                  Next 's appt: 25  Visit# / total visits:   Cancels/No Shows: 0/0  PN due at visit 10 per medicare guidelines  Subjective:    Pain:  [x] Yes  [] No Location: left knee/ right buttock  Pain Rating: (0-10 scale) 3/10 (hip), 5/10 (knee)  Pain altered Tx:  [x] No  [] Yes  Action:  Comments: Patient states left knee has been more bothersome than hip lately. She notices pain mostly with putting weight through LE's/ going up stairs.   Objective:  Walks in with slow, antalgic gait  Modalities:   Precautions: none  Exercises:  MAT TABLE         Janett Piriformis Stretch 15\" x 4  pain on left     Janett Modified Jacob Stretch 30\" x 2 janett       Supine Ham stretch 15\" x 4 janett       4 way SLR  10 - right only  add left next time  added /   SLR with toe out  x10- right only  pain- add left  added 6/   Janett Hip Add Isos  5\" x10    added /   Janett hip Abd  x10  lime  added     bridging   x10    added 7/                                 SEATED         Towel Calf Stretch 15\" x 4 janett        seated hamstring stretch with right foot on chair  instructed    added 6/20             STAND         Ham Stretch At Steps  15\" x4 janett    added /   Janett Calf Stretch  15\" x4 right only  pain on left      hip abd  x10 janett    added 7/              roller to right buttock/ lateral hip

## 2025-07-11 ENCOUNTER — HOSPITAL ENCOUNTER (OUTPATIENT)
Age: 80
Setting detail: THERAPIES SERIES
Discharge: HOME | End: 2025-07-11
Attending: FAMILY MEDICINE
Payer: MEDICARE

## 2025-07-11 PROCEDURE — 97110 THERAPEUTIC EXERCISES: CPT

## 2025-07-11 NOTE — FLOWSHEET NOTE
[x] [facility]  Outpatient Rehabilitation & Therapy  [address] [place], [place] [address]    Physical Therapy Daily Treatment Note      Date:  2025  Patient Name:  Radha Carias    :  1945  MRN: [id number]  Physician:  Cameron Ledezma MD                                    Insurance: [facility] MEDICARE;  No Hard Max; AUTH needed after eval thru [employer] 16 VISITS (6/10/25-25)   Medical Diagnosis:   M25.562 (ICD-10-CM) - Acute pain of left knee   M70.71 (ICD-10-CM) - Ischial bursitis of right side                             Rehab Codes: M25.551; M25.661, M25.662,   Onset Date: 25                                  Next 's appt: 25  Visit# / total visits:   Cancels/No Shows: 0/0  PN due at visit 10 per medicare guidelines  Subjective:    Pain:  [x] Yes  [] No Location: left knee/ right buttock  Pain Rating: (0-10 scale) 3/10 (hip), 5/10 (knee)  Pain altered Tx:  [x] No  [] Yes  Action:  Comments Both hip and knee were a little more sore than usual after last session- unsure of reasoning, but she states she feels better today  Objective:  Walks in with slow, antalgic gait  Modalities:   Precautions: none  Exercises:        NUSTEP L3 x5'  Added 7/               MAT TABLE                           Janett Piriformis Stretch 15\" x 4       Janett Modified Jacob Stretch 30\" x 2 janett       Supine Ham stretch 15\" x 4 janett       4 way SLR  10 - janett   added 6/   SLR with toe out  x10-    added 6/   Janett Hip Add Isos  5\" x10    added 6/   Janett hip Abd  x10  lime  added 6/    bridging   x10    added 7/                                 SEATED         Towel Calf Stretch 15\" x 4 janett        seated hamstring stretch with right foot on chair 15\" x3    added 6/20             STAND         Ham Stretch At Steps  15\" x4 janett NP    added 6/   Janett Calf Stretch  15\" x4 NP  pain on left      hip abd  x10 janett    added 7/    heel/ toe raises  x10    added /    roller to right buttock/

## 2025-07-14 ENCOUNTER — HOSPITAL ENCOUNTER (OUTPATIENT)
Age: 80
Setting detail: THERAPIES SERIES
Discharge: HOME | End: 2025-07-14
Attending: FAMILY MEDICINE
Payer: MEDICARE

## 2025-07-14 PROCEDURE — 97110 THERAPEUTIC EXERCISES: CPT

## 2025-07-14 PROCEDURE — G0283 ELEC STIM OTHER THAN WOUND: HCPCS

## 2025-07-14 NOTE — FLOWSHEET NOTE
with correct form without cues.     LTG: (to be met in 24 treatments)  Improve WOMAC score to 20% impaired at worst so pt can sit to stand at least 75% of the time without increased pain  Improve bilateral knee ROM to 0-130 degrees so pt can complete ADLs without knee or hip pain  Improve bilateral LE strength to wfl so pt can mow lawn without increased pain     Patient goals: Learn exercises to help with hip bursitis    Pt. Education:  [x] Yes  [] No  [x] Reviewed Prior HEP/Ed  Method of Education: [x] Verbal  [x] Demo  [] Written  Comprehension of Education:  [] Verbalizes understanding.  [] Demonstrates understanding.  [x] Needs review.  [] Demonstrates/verbalizes HEP/Ed previously given.   Access Code: [email or web address]  URL: [email or web address]  Date: 06/13/2025  Prepared by: Magda Crouch    Exercises  - Supine Active Straight Leg Raise  - 1 x daily - 7 x weekly - 1 sets - 10 reps  - Sidelying Hip Abduction  - 1 x daily - 7 x weekly - 1 sets - 10 reps  - Prone Hip Extension  - 1 x daily - 7 x weekly - 1 sets - 10 reps  - Sidelying Hip Adduction  - 1 x daily - 7 x weekly - 1 sets - 10 reps  - Straight Leg Raise with External Rotation  - 1 x daily - 7 x weekly - 1 sets - 10 reps  - Supine Hip Adduction Isometric with Ball  - 2 x daily - 7 x weekly - 1 sets - 10 reps - 5\" hold  - Hooklying Clamshell with Resistance  - 2 x daily - 7 x weekly - 1 sets - 10 reps  - Standing Hamstring Stretch with Step  - 2 x daily - 7 x weekly - 1 sets - 4 reps - 15\" hold  - Gastroc Stretch on Wall  - 2 x daily - 7 x weekly - 1 sets - 4 reps - 15\" hold    Access Code: [email or web address]  URL: [email or web address]  Date: 06/10/2025  Prepared by: Brett Medina     Exercises  - Supine Piriformis Stretch with Leg Straight  - 2 x daily - 7 x weekly - 1 sets - 4 reps - 15 seconds hold  - Modified Jacob Stretch  - 2 x daily - 7 x weekly - 1 sets - 2 reps - 30 seconds hold  - Supine Hamstring Stretch  - 2 x daily - 7 x weekly - 1 sets - 4 reps

## 2025-07-17 ENCOUNTER — HOSPITAL ENCOUNTER (OUTPATIENT)
Age: 80
Setting detail: THERAPIES SERIES
Discharge: HOME | End: 2025-07-17
Attending: FAMILY MEDICINE
Payer: MEDICARE

## 2025-07-17 NOTE — FLOWSHEET NOTE
[x] [facility]  Outpatient Rehabilitation & Therapy  [address]   [place], [place] [address]       Physical Therapy Cancel/No Show note    Date: 2025  Patient: Radha Carias  : 1945  MRN: [id number]      Cancels/No Shows to date:     For today's appointment patient:    [x]  Cancelled    [] Rescheduled appointment    [] No-show     Reason given by patient:    []  Patient ill    []  Conflicting appointment    [] No transportation      [] Conflict with work    [] No reason given    [] Weather related    [] COVID-19    [x] Other:      Comments:  emergency      [x] Next appointment was confirmed    Electronically signed by: Magda Crouch PTA

## 2025-07-21 ENCOUNTER — HOSPITAL ENCOUNTER (OUTPATIENT)
Age: 80
Setting detail: THERAPIES SERIES
Discharge: HOME | End: 2025-07-21
Attending: FAMILY MEDICINE
Payer: MEDICARE

## 2025-07-21 PROCEDURE — G0283 ELEC STIM OTHER THAN WOUND: HCPCS

## 2025-07-21 PROCEDURE — 97110 THERAPEUTIC EXERCISES: CPT

## 2025-07-21 NOTE — FLOWSHEET NOTE
[x] [facility]  Outpatient Rehabilitation & Therapy  [address] [place], [place] [address]    Physical Therapy Daily Treatment Note      Date:  2025  Patient Name:  Radha Carias    :  1945  MRN: [id number]  Physician:  Cameron Ledezma MD                                    Insurance: [facility] MEDICARE;  No Hard Max; AUTH needed after eval thru [employer] 16 VISITS (6/10/25-25)   Medical Diagnosis:   M25.562 (ICD-10-CM) - Acute pain of left knee   M70.71 (ICD-10-CM) - Ischial bursitis of right side                             Rehab Codes: M25.551; M25.661, M25.662,   Onset Date: 25                                  Next 's appt: 25  Visit# / total visits:   Cancels/No Shows: 0/0  PN due at visit 10 per medicare guidelines  Subjective:    Pain:  [x] Yes  [] No Location: left knee/ right buttock  Pain Rating: (0-10 scale) 2/10 (right hip), 2/10 (knee)  Pain altered Tx:  [x] No  [] Yes  Action:  Comments Patient reports feeling much better;  She reports pain is improved.  She also reports when she is sore the exercises help relieve her pain    Objective:  Walks in with slow, antalgic gait  Modalities: TENS/Heat  Precautions: none  Exercises:        NUSTEP L3 x5'  Added                MAT TABLE                           Janett Piriformis Stretch 15\" x 4       Janett Modified Jacob Stretch 30\" x 2 janett        Supine Ham stretch 15\" x 4 janett       4 way SLR  10 - janett   added    SLR with toe out  x10- (right only)   added    Janett Hip Add Isos  5\" x10    added    Janett hip Abd  x10  lime  added     bridging   x10    added      with belt around feet                                  SEATED         Towel Calf Stretch 15\" x 4 NP        seated hamstring stretch with right foot on chair 15\" x3 NP    added              STAND         Ham Stretch At Steps  15\" x4 janett     added    Janett Calf Stretch  15\" x4 NP  pain on left      hip abd  x10 janett    added

## 2025-07-30 ENCOUNTER — HOSPITAL ENCOUNTER (OUTPATIENT)
Age: 80
Setting detail: THERAPIES SERIES
Discharge: HOME | End: 2025-07-30
Attending: FAMILY MEDICINE
Payer: MEDICARE

## 2025-07-30 PROCEDURE — 97530 THERAPEUTIC ACTIVITIES: CPT

## 2025-07-30 PROCEDURE — G0283 ELEC STIM OTHER THAN WOUND: HCPCS

## 2025-07-30 NOTE — FLOWSHEET NOTE
[x] [facility]  Outpatient Rehabilitation & Therapy  [address] [place], [place] [address]    Physical Therapy Daily Treatment Note      Date:  2025  Patient Name:  Radha Carias    :  1945  MRN: [id number]  Physician:  Cameron Ledezma MD                                    Insurance: [facility] MEDICARE;  No Hard Max; AUTH needed after eval thru [employer] 16 VISITS (6/10/25-25)   Medical Diagnosis:   M25.562 (ICD-10-CM) - Acute pain of left knee   M70.71 (ICD-10-CM) - Ischial bursitis of right side                             Rehab Codes: M25.551; M25.661, M25.662,   Onset Date: 25                                  Next 's appt: 25  Visit# / total visits:  (visit count corrected )  Cancels/No Shows: 0/0  PN due at visit 10 per medicare guidelines  Subjective:    Pain:  [x] Yes  [] No Location: left knee/ right buttock  Pain Rating: (0-10 scale) 1/10 (right hip), 2/10 (knee)  Pain altered Tx:  [x] No  [] Yes  Action:  Comments Patient presents today stating she has been feeling pretty good lately- compliant with HEP. She requested to hold further therapy for now and see how she does on her own for a couple weeks (she requested to cancel last scheduled appt for next week). She also requested to complete only NUSTEP and TENS/Heat today as asthma has been bothering her a lot today due to weather- and she forgot her rescue inhaler at home (she also notes she is pretty independent with mat ex's).     Objective:  Some slight antalgia noted, but normal walking becky coming in to session.                  ROM  ° A/P STRENGTH   ROM     Left Right Left Right       Hip ER 45 45           Hip IR 30  30 P     Lumbar     SLR 90 90     Flexion NT   Hip Flex         Extension NT   Ext NT NT NT NT Rotation L NT  R NT   Abd     -4 -4 Sidebend L R   Knee Flex 135 125 5 5         Ext 0 0 5 5         Ankle DF 5 5 5 5         PF     5 5           Modalities: TENS/Heat  Precautions:

## (undated) DEVICE — GUIDEWIRE ENDOSCP L150CM DIA0.038IN NIT HYDRPHLC STR TIP

## (undated) DEVICE — KIT PRB L440MM DIA3.9MM BLU SWISS LITHOCLAST TRIL

## (undated) DEVICE — SUTURE ETHBND EXCEL SZ 0 L30IN NONABSORBABLE GRN CT1 L36MM X424H

## (undated) DEVICE — BLADE,CARBON-STEEL,15,STRL,DISPOSABLE,TB: Brand: MEDLINE

## (undated) DEVICE — GUIDEWIRE URO L150CM DIA .035IN STIFF NIT HYDRPHL STR TIP

## (undated) DEVICE — GOWN,SIRUS,NONRNF,SETINSLV,XL,20/CS: Brand: MEDLINE

## (undated) DEVICE — 60 ML SYRINGE LUER-LOCK TIP: Brand: MONOJECT

## (undated) DEVICE — GLOVE ORANGE PI 7 1/2   MSG9075

## (undated) DEVICE — SYRINGE, LUER LOCK, 10ML: Brand: MEDLINE

## (undated) DEVICE — GARMENT,MEDLINE,DVT,INT,CALF,MED, GEN2: Brand: MEDLINE

## (undated) DEVICE — GAUZE,SPONGE,FLUFF,6"X6.75",STRL,5/TRAY: Brand: MEDLINE

## (undated) DEVICE — CATHETER F BLLN 5CC 24FR INF CTRL 2 W SIL ALLY AND HYDRGEL

## (undated) DEVICE — DRAINBAG,ANTI-REFLUX TOWER,L/F,2000ML,LL: Brand: MEDLINE

## (undated) DEVICE — SUTURE ETHBND EXCEL SZ 1 L30IN NONABSORBABLE GRN L36MM CT-1 X425H

## (undated) DEVICE — DRAPE,REIN 53X77,STERILE: Brand: MEDLINE

## (undated) DEVICE — STRAP ARMBRD W1.5XL32IN FOAM STR YET SFT W/ HK AND LOOP

## (undated) DEVICE — HIGH PRESSURE NEPHROSTOMY BALLOON CATHETER KIT: Brand: NEPHROMAX KIT

## (undated) DEVICE — SVMMC PEDS/UROLOGY MINOR PACK: Brand: MEDLINE INDUSTRIES, INC.

## (undated) DEVICE — COVER OR TBL W40XL90IN ABSRB STD AND GRIPPY BK SAHARA

## (undated) DEVICE — PROTECTOR ULN NRV PUR FOAM HK LOOP STRP ANATOMICALLY

## (undated) DEVICE — Y-TYPE TUR/BLADDER IRRIGATION SET, REGULATING CLAMP

## (undated) DEVICE — C-ARM: Brand: UNBRANDED

## (undated) DEVICE — SHEET,DRAPE,70X100,STERILE: Brand: MEDLINE

## (undated) DEVICE — TUBING, SUCTION, 9/32" X 20', STRAIGHT: Brand: MEDLINE INDUSTRIES, INC.

## (undated) DEVICE — OCCLUSION BALLOON CATHETER: Brand: OCCLUDER

## (undated) DEVICE — LIEBERMAN INTRODUCER: Brand: LIEBERMAN

## (undated) DEVICE — STRAP,POSITIONING,KNEE/BODY,FOAM,4X60": Brand: MEDLINE

## (undated) DEVICE — PACK PROCEDURE SURG CYSTO SVMMC LF

## (undated) DEVICE — CATHETER URETH 16FR BLLN 5CC SIL ALLY W/ SIL HYDRGEL 2 W F

## (undated) DEVICE — 3M™ IOBAN™ 2 ANTIMICROBIAL INCISE DRAPE 6650EZ: Brand: IOBAN™ 2

## (undated) DEVICE — UMBILICAL TAPE: Brand: DEROYAL

## (undated) DEVICE — PAD,ABDOMINAL,5"X9",ST,LF,25/BX: Brand: MEDLINE INDUSTRIES, INC.

## (undated) DEVICE — CRANIOTOMY DRAPE, STERILE: Brand: MEDLINE

## (undated) DEVICE — MAT ABSRB W28XL48IN C6L FLR ULT W POLY BK